# Patient Record
Sex: FEMALE | Race: WHITE | NOT HISPANIC OR LATINO | Employment: OTHER | URBAN - METROPOLITAN AREA
[De-identification: names, ages, dates, MRNs, and addresses within clinical notes are randomized per-mention and may not be internally consistent; named-entity substitution may affect disease eponyms.]

---

## 2017-05-03 ENCOUNTER — OFFICE VISIT (OUTPATIENT)
Dept: GASTROENTEROLOGY | Facility: CLINIC | Age: 72
End: 2017-05-03

## 2017-05-03 VITALS
DIASTOLIC BLOOD PRESSURE: 78 MMHG | WEIGHT: 158.4 LBS | SYSTOLIC BLOOD PRESSURE: 130 MMHG | HEIGHT: 68 IN | BODY MASS INDEX: 24.01 KG/M2

## 2017-05-03 DIAGNOSIS — K63.5 COLON POLYPS: Primary | ICD-10-CM

## 2017-05-03 DIAGNOSIS — K59.04 CHRONIC IDIOPATHIC CONSTIPATION: ICD-10-CM

## 2017-05-03 PROCEDURE — 99203 OFFICE O/P NEW LOW 30 MIN: CPT | Performed by: INTERNAL MEDICINE

## 2017-05-03 RX ORDER — VALSARTAN AND HYDROCHLOROTHIAZIDE 160; 12.5 MG/1; MG/1
1 TABLET, FILM COATED ORAL DAILY
COMMUNITY
Start: 2017-02-20 | End: 2017-08-30 | Stop reason: HOSPADM

## 2017-05-03 RX ORDER — LEVOTHYROXINE SODIUM 88 UG/1
88 TABLET ORAL DAILY
COMMUNITY
Start: 2017-02-12

## 2017-05-03 RX ORDER — RANITIDINE 150 MG/1
150 TABLET ORAL DAILY
COMMUNITY
Start: 2017-02-13 | End: 2020-09-22 | Stop reason: HOSPADM

## 2017-05-09 ENCOUNTER — TELEPHONE (OUTPATIENT)
Dept: GASTROENTEROLOGY | Facility: CLINIC | Age: 72
End: 2017-05-09

## 2017-07-10 ENCOUNTER — TRANSCRIBE ORDERS (OUTPATIENT)
Dept: ADMINISTRATIVE | Facility: HOSPITAL | Age: 72
End: 2017-07-10

## 2017-07-10 DIAGNOSIS — Z12.31 VISIT FOR SCREENING MAMMOGRAM: Primary | ICD-10-CM

## 2017-08-04 ENCOUNTER — HOSPITAL ENCOUNTER (OUTPATIENT)
Dept: MAMMOGRAPHY | Facility: HOSPITAL | Age: 72
Discharge: HOME | End: 2017-08-04
Admitting: INTERNAL MEDICINE

## 2017-08-04 DIAGNOSIS — Z12.31 VISIT FOR SCREENING MAMMOGRAM: ICD-10-CM

## 2017-08-04 PROCEDURE — 77063 BREAST TOMOSYNTHESIS BI: CPT

## 2017-08-04 PROCEDURE — G0202 SCR MAMMO BI INCL CAD: HCPCS

## 2017-08-29 ENCOUNTER — APPOINTMENT (OUTPATIENT)
Dept: CT IMAGING | Facility: HOSPITAL | Age: 72
End: 2017-08-29

## 2017-08-29 ENCOUNTER — HOSPITAL ENCOUNTER (OUTPATIENT)
Facility: HOSPITAL | Age: 72
Setting detail: OBSERVATION
Discharge: HOME | End: 2017-08-30
Attending: EMERGENCY MEDICINE | Admitting: INTERNAL MEDICINE

## 2017-08-29 DIAGNOSIS — R55 SYNCOPE AND COLLAPSE: Primary | ICD-10-CM

## 2017-08-29 DIAGNOSIS — R11.2 NON-INTRACTABLE VOMITING WITH NAUSEA, UNSPECIFIED VOMITING TYPE: ICD-10-CM

## 2017-08-29 DIAGNOSIS — D72.829 LEUKOCYTOSIS, UNSPECIFIED TYPE: ICD-10-CM

## 2017-08-29 DIAGNOSIS — IMO0002 POSITIONAL VERTIGO, UNSPECIFIED LATERALITY: ICD-10-CM

## 2017-08-29 PROCEDURE — 96375 TX/PRO/DX INJ NEW DRUG ADDON: CPT

## 2017-08-29 PROCEDURE — 96361 HYDRATE IV INFUSION ADD-ON: CPT

## 2017-08-29 PROCEDURE — 96374 THER/PROPH/DIAG INJ IV PUSH: CPT

## 2017-08-29 PROCEDURE — 25010000002 ONDANSETRON PER 1 MG: Performed by: EMERGENCY MEDICINE

## 2017-08-29 PROCEDURE — 99284 EMERGENCY DEPT VISIT MOD MDM: CPT

## 2017-08-29 PROCEDURE — 93005 ELECTROCARDIOGRAM TRACING: CPT | Performed by: EMERGENCY MEDICINE

## 2017-08-29 RX ORDER — LORAZEPAM 2 MG/ML
0.5 INJECTION INTRAMUSCULAR ONCE
Status: COMPLETED | OUTPATIENT
Start: 2017-08-29 | End: 2017-08-30

## 2017-08-29 RX ORDER — ONDANSETRON 2 MG/ML
4 INJECTION INTRAMUSCULAR; INTRAVENOUS ONCE
Status: COMPLETED | OUTPATIENT
Start: 2017-08-29 | End: 2017-08-29

## 2017-08-29 RX ORDER — SODIUM CHLORIDE 0.9 % (FLUSH) 0.9 %
10 SYRINGE (ML) INJECTION AS NEEDED
Status: DISCONTINUED | OUTPATIENT
Start: 2017-08-29 | End: 2017-08-30 | Stop reason: HOSPADM

## 2017-08-29 RX ADMIN — ONDANSETRON 4 MG: 2 INJECTION INTRAMUSCULAR; INTRAVENOUS at 23:57

## 2017-08-29 RX ADMIN — SODIUM CHLORIDE 1000 ML: 9 INJECTION, SOLUTION INTRAVENOUS at 23:59

## 2017-08-30 ENCOUNTER — APPOINTMENT (OUTPATIENT)
Dept: CT IMAGING | Facility: HOSPITAL | Age: 72
End: 2017-08-30

## 2017-08-30 VITALS
SYSTOLIC BLOOD PRESSURE: 115 MMHG | HEART RATE: 67 BPM | BODY MASS INDEX: 23.82 KG/M2 | TEMPERATURE: 98 F | HEIGHT: 68 IN | WEIGHT: 157.2 LBS | RESPIRATION RATE: 18 BRPM | OXYGEN SATURATION: 96 % | DIASTOLIC BLOOD PRESSURE: 58 MMHG

## 2017-08-30 PROBLEM — R55 SYNCOPE AND COLLAPSE: Status: ACTIVE | Noted: 2017-08-30

## 2017-08-30 PROBLEM — R11.2 N&V (NAUSEA AND VOMITING): Status: ACTIVE | Noted: 2017-08-30

## 2017-08-30 PROBLEM — H81.13 BENIGN PAROXYSMAL POSITIONAL VERTIGO DUE TO BILATERAL VESTIBULAR DISORDER: Status: ACTIVE | Noted: 2017-08-30

## 2017-08-30 PROBLEM — I10 ESSENTIAL HYPERTENSION: Status: ACTIVE | Noted: 2017-08-30

## 2017-08-30 LAB
ALBUMIN SERPL-MCNC: 3.9 G/DL (ref 3.5–5.2)
ALBUMIN/GLOB SERPL: 1.6 G/DL
ALP SERPL-CCNC: 69 U/L (ref 39–117)
ALT SERPL W P-5'-P-CCNC: 25 U/L (ref 1–33)
ANION GAP SERPL CALCULATED.3IONS-SCNC: 14.3 MMOL/L
ANION GAP SERPL CALCULATED.3IONS-SCNC: 15.3 MMOL/L
AST SERPL-CCNC: 19 U/L (ref 1–32)
BASOPHILS # BLD AUTO: 0.01 10*3/MM3 (ref 0–0.2)
BASOPHILS NFR BLD AUTO: 0.1 % (ref 0–1.5)
BILIRUB SERPL-MCNC: 0.2 MG/DL (ref 0.1–1.2)
BUN BLD-MCNC: 24 MG/DL (ref 8–23)
BUN BLD-MCNC: 27 MG/DL (ref 8–23)
BUN/CREAT SERPL: 32 (ref 7–25)
BUN/CREAT SERPL: 32.9 (ref 7–25)
CALCIUM SPEC-SCNC: 8.1 MG/DL (ref 8.6–10.5)
CALCIUM SPEC-SCNC: 8.7 MG/DL (ref 8.6–10.5)
CHLORIDE SERPL-SCNC: 103 MMOL/L (ref 98–107)
CHLORIDE SERPL-SCNC: 107 MMOL/L (ref 98–107)
CO2 SERPL-SCNC: 23.7 MMOL/L (ref 22–29)
CO2 SERPL-SCNC: 24.7 MMOL/L (ref 22–29)
CREAT BLD-MCNC: 0.75 MG/DL (ref 0.57–1)
CREAT BLD-MCNC: 0.82 MG/DL (ref 0.57–1)
DEPRECATED RDW RBC AUTO: 49.1 FL (ref 37–54)
DEPRECATED RDW RBC AUTO: 49.4 FL (ref 37–54)
EOSINOPHIL # BLD AUTO: 0.05 10*3/MM3 (ref 0–0.7)
EOSINOPHIL NFR BLD AUTO: 0.3 % (ref 0.3–6.2)
ERYTHROCYTE [DISTWIDTH] IN BLOOD BY AUTOMATED COUNT: 14.5 % (ref 11.7–13)
ERYTHROCYTE [DISTWIDTH] IN BLOOD BY AUTOMATED COUNT: 14.5 % (ref 11.7–13)
GFR SERPL CREATININE-BSD FRML MDRD: 68 ML/MIN/1.73
GFR SERPL CREATININE-BSD FRML MDRD: 76 ML/MIN/1.73
GLOBULIN UR ELPH-MCNC: 2.4 GM/DL
GLUCOSE BLD-MCNC: 117 MG/DL (ref 65–99)
GLUCOSE BLD-MCNC: 133 MG/DL (ref 65–99)
HCT VFR BLD AUTO: 37.4 % (ref 35.6–45.5)
HCT VFR BLD AUTO: 39.4 % (ref 35.6–45.5)
HGB BLD-MCNC: 11.9 G/DL (ref 11.9–15.5)
HGB BLD-MCNC: 12.5 G/DL (ref 11.9–15.5)
IMM GRANULOCYTES # BLD: 0.05 10*3/MM3 (ref 0–0.03)
IMM GRANULOCYTES NFR BLD: 0.3 % (ref 0–0.5)
LYMPHOCYTES # BLD AUTO: 0.97 10*3/MM3 (ref 0.9–4.8)
LYMPHOCYTES NFR BLD AUTO: 6.7 % (ref 19.6–45.3)
MCH RBC QN AUTO: 29.2 PG (ref 26.9–32)
MCH RBC QN AUTO: 29.3 PG (ref 26.9–32)
MCHC RBC AUTO-ENTMCNC: 31.7 G/DL (ref 32.4–36.3)
MCHC RBC AUTO-ENTMCNC: 31.8 G/DL (ref 32.4–36.3)
MCV RBC AUTO: 91.9 FL (ref 80.5–98.2)
MCV RBC AUTO: 92.5 FL (ref 80.5–98.2)
MONOCYTES # BLD AUTO: 0.57 10*3/MM3 (ref 0.2–1.2)
MONOCYTES NFR BLD AUTO: 4 % (ref 5–12)
NEUTROPHILS # BLD AUTO: 12.76 10*3/MM3 (ref 1.9–8.1)
NEUTROPHILS NFR BLD AUTO: 88.6 % (ref 42.7–76)
PLATELET # BLD AUTO: 194 10*3/MM3 (ref 140–500)
PLATELET # BLD AUTO: 213 10*3/MM3 (ref 140–500)
PMV BLD AUTO: 10.1 FL (ref 6–12)
PMV BLD AUTO: 10.3 FL (ref 6–12)
POTASSIUM BLD-SCNC: 3.6 MMOL/L (ref 3.5–5.2)
POTASSIUM BLD-SCNC: 4.1 MMOL/L (ref 3.5–5.2)
PROT SERPL-MCNC: 6.3 G/DL (ref 6–8.5)
RBC # BLD AUTO: 4.07 10*6/MM3 (ref 3.9–5.2)
RBC # BLD AUTO: 4.26 10*6/MM3 (ref 3.9–5.2)
SODIUM BLD-SCNC: 141 MMOL/L (ref 136–145)
SODIUM BLD-SCNC: 147 MMOL/L (ref 136–145)
TROPONIN T SERPL-MCNC: <0.01 NG/ML (ref 0–0.03)
WBC NRBC COR # BLD: 14.41 10*3/MM3 (ref 4.5–10.7)
WBC NRBC COR # BLD: 9.26 10*3/MM3 (ref 4.5–10.7)

## 2017-08-30 PROCEDURE — G0378 HOSPITAL OBSERVATION PER HR: HCPCS

## 2017-08-30 PROCEDURE — 97110 THERAPEUTIC EXERCISES: CPT | Performed by: PHYSICAL THERAPIST

## 2017-08-30 PROCEDURE — G8978 MOBILITY CURRENT STATUS: HCPCS | Performed by: PHYSICAL THERAPIST

## 2017-08-30 PROCEDURE — 80053 COMPREHEN METABOLIC PANEL: CPT | Performed by: EMERGENCY MEDICINE

## 2017-08-30 PROCEDURE — G8979 MOBILITY GOAL STATUS: HCPCS | Performed by: PHYSICAL THERAPIST

## 2017-08-30 PROCEDURE — G8980 MOBILITY D/C STATUS: HCPCS | Performed by: PHYSICAL THERAPIST

## 2017-08-30 PROCEDURE — 84484 ASSAY OF TROPONIN QUANT: CPT | Performed by: EMERGENCY MEDICINE

## 2017-08-30 PROCEDURE — 70450 CT HEAD/BRAIN W/O DYE: CPT

## 2017-08-30 PROCEDURE — 96375 TX/PRO/DX INJ NEW DRUG ADDON: CPT

## 2017-08-30 PROCEDURE — 96361 HYDRATE IV INFUSION ADD-ON: CPT

## 2017-08-30 PROCEDURE — 85025 COMPLETE CBC W/AUTO DIFF WBC: CPT | Performed by: EMERGENCY MEDICINE

## 2017-08-30 PROCEDURE — 85027 COMPLETE CBC AUTOMATED: CPT | Performed by: INTERNAL MEDICINE

## 2017-08-30 PROCEDURE — 93010 ELECTROCARDIOGRAM REPORT: CPT | Performed by: INTERNAL MEDICINE

## 2017-08-30 PROCEDURE — 25010000002 LORAZEPAM PER 2 MG: Performed by: EMERGENCY MEDICINE

## 2017-08-30 PROCEDURE — 97161 PT EVAL LOW COMPLEX 20 MIN: CPT | Performed by: PHYSICAL THERAPIST

## 2017-08-30 PROCEDURE — 94799 UNLISTED PULMONARY SVC/PX: CPT

## 2017-08-30 RX ORDER — MECLIZINE HYDROCHLORIDE 25 MG/1
25 TABLET ORAL 3 TIMES DAILY PRN
Status: DISCONTINUED | OUTPATIENT
Start: 2017-08-30 | End: 2017-08-30 | Stop reason: HOSPADM

## 2017-08-30 RX ORDER — SODIUM CHLORIDE 9 MG/ML
75 INJECTION, SOLUTION INTRAVENOUS CONTINUOUS
Status: DISCONTINUED | OUTPATIENT
Start: 2017-08-30 | End: 2017-08-30 | Stop reason: HOSPADM

## 2017-08-30 RX ORDER — SODIUM CHLORIDE 0.9 % (FLUSH) 0.9 %
1-10 SYRINGE (ML) INJECTION AS NEEDED
Status: DISCONTINUED | OUTPATIENT
Start: 2017-08-30 | End: 2017-08-30 | Stop reason: HOSPADM

## 2017-08-30 RX ORDER — MECLIZINE HYDROCHLORIDE 25 MG/1
25 TABLET ORAL 3 TIMES DAILY PRN
Qty: 20 TABLET | Refills: 0 | Status: ON HOLD | OUTPATIENT
Start: 2017-08-30 | End: 2017-10-18 | Stop reason: ALTCHOICE

## 2017-08-30 RX ORDER — ONDANSETRON 4 MG/1
4 TABLET, FILM COATED ORAL EVERY 6 HOURS PRN
Status: DISCONTINUED | OUTPATIENT
Start: 2017-08-30 | End: 2017-08-30 | Stop reason: HOSPADM

## 2017-08-30 RX ORDER — ONDANSETRON 2 MG/ML
4 INJECTION INTRAMUSCULAR; INTRAVENOUS EVERY 6 HOURS PRN
Status: DISCONTINUED | OUTPATIENT
Start: 2017-08-30 | End: 2017-08-30 | Stop reason: HOSPADM

## 2017-08-30 RX ORDER — NITROGLYCERIN 0.4 MG/1
0.4 TABLET SUBLINGUAL
Status: DISCONTINUED | OUTPATIENT
Start: 2017-08-30 | End: 2017-08-30 | Stop reason: HOSPADM

## 2017-08-30 RX ORDER — VALSARTAN 80 MG/1
80 TABLET ORAL DAILY
Qty: 30 TABLET | Refills: 0 | Status: ON HOLD | OUTPATIENT
Start: 2017-08-30 | End: 2017-10-18 | Stop reason: ALTCHOICE

## 2017-08-30 RX ORDER — CALCIUM CARBONATE 200(500)MG
2 TABLET,CHEWABLE ORAL 2 TIMES DAILY PRN
Status: DISCONTINUED | OUTPATIENT
Start: 2017-08-30 | End: 2017-08-30 | Stop reason: HOSPADM

## 2017-08-30 RX ORDER — ONDANSETRON 4 MG/1
4 TABLET, ORALLY DISINTEGRATING ORAL EVERY 6 HOURS PRN
Status: DISCONTINUED | OUTPATIENT
Start: 2017-08-30 | End: 2017-08-30 | Stop reason: HOSPADM

## 2017-08-30 RX ORDER — ACETAMINOPHEN 325 MG/1
650 TABLET ORAL EVERY 4 HOURS PRN
Status: DISCONTINUED | OUTPATIENT
Start: 2017-08-30 | End: 2017-08-30 | Stop reason: HOSPADM

## 2017-08-30 RX ADMIN — SODIUM CHLORIDE 75 ML/HR: 9 INJECTION, SOLUTION INTRAVENOUS at 04:45

## 2017-08-30 RX ADMIN — LORAZEPAM 0.5 MG: 2 INJECTION INTRAMUSCULAR; INTRAVENOUS at 00:16

## 2017-10-18 ENCOUNTER — ANESTHESIA EVENT (OUTPATIENT)
Dept: GASTROENTEROLOGY | Facility: HOSPITAL | Age: 72
End: 2017-10-18

## 2017-10-18 ENCOUNTER — HOSPITAL ENCOUNTER (OUTPATIENT)
Facility: HOSPITAL | Age: 72
Setting detail: HOSPITAL OUTPATIENT SURGERY
Discharge: HOME | End: 2017-10-18
Attending: INTERNAL MEDICINE | Admitting: INTERNAL MEDICINE

## 2017-10-18 ENCOUNTER — ANESTHESIA (OUTPATIENT)
Dept: GASTROENTEROLOGY | Facility: HOSPITAL | Age: 72
End: 2017-10-18

## 2017-10-18 VITALS
BODY MASS INDEX: 23.49 KG/M2 | WEIGHT: 155 LBS | DIASTOLIC BLOOD PRESSURE: 88 MMHG | TEMPERATURE: 97.5 F | SYSTOLIC BLOOD PRESSURE: 145 MMHG | RESPIRATION RATE: 16 BRPM | OXYGEN SATURATION: 100 % | HEART RATE: 54 BPM | HEIGHT: 68 IN

## 2017-10-18 DIAGNOSIS — K63.5 COLON POLYPS: ICD-10-CM

## 2017-10-18 PROCEDURE — 45380 COLONOSCOPY AND BIOPSY: CPT | Performed by: INTERNAL MEDICINE

## 2017-10-18 PROCEDURE — 45385 COLONOSCOPY W/LESION REMOVAL: CPT | Performed by: INTERNAL MEDICINE

## 2017-10-18 PROCEDURE — 25010000002 ONDANSETRON PER 1 MG: Performed by: ANESTHESIOLOGY

## 2017-10-18 PROCEDURE — 88305 TISSUE EXAM BY PATHOLOGIST: CPT | Performed by: INTERNAL MEDICINE

## 2017-10-18 PROCEDURE — 25010000002 PROPOFOL 10 MG/ML EMULSION: Performed by: ANESTHESIOLOGY

## 2017-10-18 PROCEDURE — S0260 H&P FOR SURGERY: HCPCS | Performed by: INTERNAL MEDICINE

## 2017-10-18 RX ORDER — PROPOFOL 10 MG/ML
VIAL (ML) INTRAVENOUS AS NEEDED
Status: DISCONTINUED | OUTPATIENT
Start: 2017-10-18 | End: 2017-10-18 | Stop reason: SURG

## 2017-10-18 RX ORDER — ONDANSETRON 2 MG/ML
INJECTION INTRAMUSCULAR; INTRAVENOUS AS NEEDED
Status: DISCONTINUED | OUTPATIENT
Start: 2017-10-18 | End: 2017-10-18 | Stop reason: SURG

## 2017-10-18 RX ORDER — SODIUM CHLORIDE 0.9 % (FLUSH) 0.9 %
3 SYRINGE (ML) INJECTION AS NEEDED
Status: DISCONTINUED | OUTPATIENT
Start: 2017-10-18 | End: 2017-10-18 | Stop reason: HOSPADM

## 2017-10-18 RX ORDER — PROPOFOL 10 MG/ML
VIAL (ML) INTRAVENOUS CONTINUOUS PRN
Status: DISCONTINUED | OUTPATIENT
Start: 2017-10-18 | End: 2017-10-18 | Stop reason: SURG

## 2017-10-18 RX ORDER — SODIUM CHLORIDE, SODIUM LACTATE, POTASSIUM CHLORIDE, CALCIUM CHLORIDE 600; 310; 30; 20 MG/100ML; MG/100ML; MG/100ML; MG/100ML
1000 INJECTION, SOLUTION INTRAVENOUS CONTINUOUS PRN
Status: DISCONTINUED | OUTPATIENT
Start: 2017-10-18 | End: 2017-10-18 | Stop reason: HOSPADM

## 2017-10-18 RX ORDER — LIDOCAINE HYDROCHLORIDE 20 MG/ML
INJECTION, SOLUTION INFILTRATION; PERINEURAL AS NEEDED
Status: DISCONTINUED | OUTPATIENT
Start: 2017-10-18 | End: 2017-10-18 | Stop reason: SURG

## 2017-10-18 RX ORDER — ASPIRIN 81 MG/1
81 TABLET ORAL 3 TIMES WEEKLY
COMMUNITY
End: 2020-09-02

## 2017-10-18 RX ADMIN — PROPOFOL 140 MG: 10 INJECTION, EMULSION INTRAVENOUS at 08:09

## 2017-10-18 RX ADMIN — PROPOFOL 180 MCG/KG/MIN: 10 INJECTION, EMULSION INTRAVENOUS at 08:12

## 2017-10-18 RX ADMIN — ONDANSETRON 4 MG: 2 INJECTION INTRAMUSCULAR; INTRAVENOUS at 08:34

## 2017-10-18 RX ADMIN — SODIUM CHLORIDE, POTASSIUM CHLORIDE, SODIUM LACTATE AND CALCIUM CHLORIDE 1000 ML: 600; 310; 30; 20 INJECTION, SOLUTION INTRAVENOUS at 07:50

## 2017-10-18 RX ADMIN — LIDOCAINE HYDROCHLORIDE 50 MG: 20 INJECTION, SOLUTION INFILTRATION; PERINEURAL at 08:09

## 2017-10-18 NOTE — PLAN OF CARE
Problem: GI Endoscopy (Adult)  Intervention: Monitor/Manage Procedure Recovery    10/18/17 0716   Respiratory Interventions   Airway/Ventilation Management airway patency maintained   Coping/Psychosocial Interventions   Environmental Support calm environment promoted   Activity   Activity Type activity adjusted per tolerance   Cardiac Interventions   Warming Thermoregulation Maintenance warm blankets applied       Intervention: Prevent Leann-procedural Injury    10/18/17 0716   Positioning   Positioning side lying, left         Goal: Signs and Symptoms of Listed Potential Problems Will be Absent or Manageable (GI Endoscopy)  Outcome: Ongoing (interventions implemented as appropriate)    10/18/17 0716   GI Endoscopy   Problems Assessed (GI Endoscopy) all   Problems Present (GI Endoscopy) none         Problem: Patient Care Overview (Adult)  Goal: Plan of Care Review  Outcome: Ongoing (interventions implemented as appropriate)    10/18/17 0716   Coping/Psychosocial Response Interventions   Plan Of Care Reviewed With patient   Patient Care Overview   Progress progress toward functional goals as expected       Goal: Adult Individualization and Mutuality  Outcome: Ongoing (interventions implemented as appropriate)  Goal: Discharge Needs Assessment  Outcome: Ongoing (interventions implemented as appropriate)    10/18/17 0716   Discharge Needs Assessment   Concerns To Be Addressed no discharge needs identified   Discharge Disposition home or self-care   Living Environment   Transportation Available car;family or friend will provide

## 2017-10-18 NOTE — ANESTHESIA PREPROCEDURE EVALUATION
Anesthesia Evaluation     Patient summary reviewed and Nursing notes reviewed   history of anesthetic complications: PONV  NPO Solid Status: > 8 hours  NPO Liquid Status: > 6 hours     Airway   Mallampati: II  TM distance: >3 FB  Neck ROM: full  no difficulty expected  Dental - normal exam     Pulmonary - negative pulmonary ROS and normal exam   Cardiovascular - normal exam    Rhythm: regular  Rate: normal    (+) hypertension,       Neuro/Psych  (+) syncope,    GI/Hepatic/Renal/Endo    (+)  hypothyroidism,     Musculoskeletal (-) negative ROS    Abdominal  - normal exam   Substance History - negative use     OB/GYN negative ob/gyn ROS         Other - negative ROS                                       Anesthesia Plan    ASA 2     MAC     intravenous induction   Anesthetic plan and risks discussed with patient.

## 2017-10-18 NOTE — ANESTHESIA POSTPROCEDURE EVALUATION
"Patient: Anabell Damian    Procedure Summary     Date Anesthesia Start Anesthesia Stop Room / Location    10/18/17 0803 0909  [facility]       Procedure Diagnosis Surgeon Provider    COLONOSCOPY into cecum with cold biopsy polypectomy and hot snare polypectomies (N/A ) Colon polyps  (Colon polyps [K63.5]) MD Roya Bradley MD          Anesthesia Type: MAC  Last vitals  BP   127/78 (10/18/17 0915)   Temp   36.4 °C (97.5 °F) (10/18/17 0915)   Pulse   60 (10/18/17 0915)   Resp   16 (10/18/17 0915)     SpO2   98 % (10/18/17 0915)     Post Anesthesia Care and Evaluation    Patient location during evaluation: PACU  Patient participation: complete - patient participated  Level of consciousness: awake  Pain score: 0  Pain management: adequate  Airway patency: patent  Anesthetic complications: No anesthetic complications  PONV Status: none  Cardiovascular status: acceptable  Respiratory status: acceptable  Hydration status: acceptable    Comments: /78 (BP Location: Left arm, Patient Position: Lying)  Pulse 60  Temp 36.4 °C (97.5 °F) (Oral)   Resp 16  Ht 68\" (172.7 cm)  Wt 155 lb (70.3 kg)  SpO2 98%  BMI 23.57 kg/m2      "

## 2017-10-20 LAB
CYTO UR: NORMAL
LAB AP CASE REPORT: NORMAL
Lab: NORMAL
PATH REPORT.FINAL DX SPEC: NORMAL
PATH REPORT.GROSS SPEC: NORMAL

## 2017-10-30 ENCOUNTER — TELEPHONE (OUTPATIENT)
Dept: GASTROENTEROLOGY | Facility: CLINIC | Age: 72
End: 2017-10-30

## 2018-08-13 ENCOUNTER — TRANSCRIBE ORDERS (OUTPATIENT)
Dept: ADMINISTRATIVE | Facility: HOSPITAL | Age: 73
End: 2018-08-13

## 2018-08-13 DIAGNOSIS — Z12.39 SCREENING BREAST EXAMINATION: Primary | ICD-10-CM

## 2018-08-24 ENCOUNTER — HOSPITAL ENCOUNTER (OUTPATIENT)
Dept: MAMMOGRAPHY | Facility: HOSPITAL | Age: 73
Discharge: HOME | End: 2018-08-24
Admitting: INTERNAL MEDICINE

## 2018-08-24 DIAGNOSIS — Z12.39 SCREENING BREAST EXAMINATION: ICD-10-CM

## 2018-08-24 PROCEDURE — 77063 BREAST TOMOSYNTHESIS BI: CPT

## 2018-08-24 PROCEDURE — 77067 SCR MAMMO BI INCL CAD: CPT

## 2019-07-29 ENCOUNTER — TRANSCRIBE ORDERS (OUTPATIENT)
Dept: ADMINISTRATIVE | Facility: HOSPITAL | Age: 74
End: 2019-07-29

## 2019-07-29 DIAGNOSIS — Z12.39 BREAST CANCER SCREENING: Primary | ICD-10-CM

## 2019-08-26 ENCOUNTER — HOSPITAL ENCOUNTER (OUTPATIENT)
Dept: MAMMOGRAPHY | Facility: HOSPITAL | Age: 74
Discharge: HOME | End: 2019-08-26
Admitting: INTERNAL MEDICINE

## 2019-08-26 DIAGNOSIS — Z12.39 BREAST CANCER SCREENING: ICD-10-CM

## 2019-08-26 PROCEDURE — 77063 BREAST TOMOSYNTHESIS BI: CPT

## 2019-08-26 PROCEDURE — 77067 SCR MAMMO BI INCL CAD: CPT

## 2019-12-13 ENCOUNTER — TRANSCRIBE ORDERS (OUTPATIENT)
Dept: ADMINISTRATIVE | Facility: HOSPITAL | Age: 74
End: 2019-12-13

## 2019-12-13 DIAGNOSIS — Z13.6 ENCOUNTER FOR SCREENING FOR VASCULAR DISEASE: Primary | ICD-10-CM

## 2019-12-20 ENCOUNTER — HOSPITAL ENCOUNTER (OUTPATIENT)
Dept: CARDIOLOGY | Facility: HOSPITAL | Age: 74
Discharge: HOME | End: 2019-12-20
Admitting: INTERNAL MEDICINE

## 2019-12-20 VITALS
BODY MASS INDEX: 23.95 KG/M2 | WEIGHT: 158 LBS | DIASTOLIC BLOOD PRESSURE: 80 MMHG | HEIGHT: 68 IN | SYSTOLIC BLOOD PRESSURE: 162 MMHG | HEART RATE: 66 BPM

## 2019-12-20 DIAGNOSIS — Z13.6 ENCOUNTER FOR SCREENING FOR VASCULAR DISEASE: ICD-10-CM

## 2019-12-20 LAB
BH CV VAS BP LEFT ARM: NORMAL MMHG
BH CV VAS BP RIGHT ARM: NORMAL MMHG
BH CV XLRA MEAS LEFT ICA/CCA RATIO: 1.54
BH CV XLRA MEAS LEFT MID CCA PSV: NORMAL CM/SEC
BH CV XLRA MEAS LEFT MID ICA PSV: NORMAL CM/SEC
BH CV XLRA MEAS LEFT PROX ECA PSV: NORMAL CM/SEC
BH CV XLRA MEAS RIGHT ICA/CCA RATIO: 1.25
BH CV XLRA MEAS RIGHT MID CCA PSV: NORMAL CM/SEC
BH CV XLRA MEAS RIGHT MID ICA PSV: NORMAL CM/SEC
BH CV XLRA MEAS RIGHT PROX ECA PSV: NORMAL CM/SEC

## 2019-12-20 PROCEDURE — 93799 UNLISTED CV SVC/PROCEDURE: CPT

## 2020-08-07 ENCOUNTER — TRANSCRIBE ORDERS (OUTPATIENT)
Dept: ADMINISTRATIVE | Facility: HOSPITAL | Age: 75
End: 2020-08-07

## 2020-08-07 DIAGNOSIS — Z12.31 SCREENING MAMMOGRAM, ENCOUNTER FOR: Primary | ICD-10-CM

## 2020-09-02 ENCOUNTER — HOSPITAL ENCOUNTER (OUTPATIENT)
Dept: MAMMOGRAPHY | Facility: HOSPITAL | Age: 75
Discharge: HOME | End: 2020-09-02
Admitting: INTERNAL MEDICINE

## 2020-09-02 ENCOUNTER — OFFICE VISIT (OUTPATIENT)
Dept: GASTROENTEROLOGY | Facility: CLINIC | Age: 75
End: 2020-09-02

## 2020-09-02 VITALS — WEIGHT: 154.4 LBS | TEMPERATURE: 98 F | BODY MASS INDEX: 23.4 KG/M2 | HEIGHT: 68 IN

## 2020-09-02 DIAGNOSIS — Z12.11 ENCOUNTER FOR SCREENING FOR MALIGNANT NEOPLASM OF COLON: ICD-10-CM

## 2020-09-02 DIAGNOSIS — Z80.0 FH: COLON CANCER: ICD-10-CM

## 2020-09-02 DIAGNOSIS — K21.9 GASTROESOPHAGEAL REFLUX DISEASE, ESOPHAGITIS PRESENCE NOT SPECIFIED: ICD-10-CM

## 2020-09-02 DIAGNOSIS — D12.6 ADENOMATOUS POLYP OF COLON, UNSPECIFIED PART OF COLON: Primary | ICD-10-CM

## 2020-09-02 DIAGNOSIS — Z12.31 SCREENING MAMMOGRAM, ENCOUNTER FOR: ICD-10-CM

## 2020-09-02 PROCEDURE — 77063 BREAST TOMOSYNTHESIS BI: CPT

## 2020-09-02 PROCEDURE — 77067 SCR MAMMO BI INCL CAD: CPT

## 2020-09-02 PROCEDURE — 99214 OFFICE O/P EST MOD 30 MIN: CPT | Performed by: NURSE PRACTITIONER

## 2020-09-02 RX ORDER — GLUCOSAMINE HCL 500 MG
1 TABLET ORAL DAILY
COMMUNITY

## 2020-09-02 RX ORDER — FAMOTIDINE 40 MG/1
40 TABLET, FILM COATED ORAL
COMMUNITY
Start: 2020-08-19

## 2020-09-02 RX ORDER — FEXOFENADINE HCL 180 MG/1
180 TABLET ORAL DAILY
COMMUNITY
End: 2022-11-17 | Stop reason: HOSPADM

## 2020-09-02 RX ORDER — MONTELUKAST SODIUM 10 MG/1
10 TABLET ORAL DAILY
Status: ON HOLD | COMMUNITY
Start: 2020-07-26 | End: 2022-11-15

## 2020-09-02 RX ORDER — FLUTICASONE PROPIONATE 50 MCG
2 SPRAY, SUSPENSION (ML) NASAL DAILY
Status: ON HOLD | COMMUNITY
Start: 2015-12-14 | End: 2022-11-15

## 2020-09-02 RX ORDER — ASCORBIC ACID 500 MG
4000 TABLET ORAL DAILY
COMMUNITY

## 2020-09-02 RX ORDER — SPIRONOLACTONE 25 MG/1
25 TABLET ORAL DAILY
COMMUNITY
Start: 2020-08-11

## 2020-09-02 RX ORDER — CHLORAL HYDRATE 500 MG
CAPSULE ORAL DAILY
COMMUNITY

## 2020-09-02 RX ORDER — MULTIVITAMIN WITH IRON
100 TABLET ORAL DAILY
COMMUNITY

## 2020-09-02 NOTE — PROGRESS NOTES
Chief Complaint   Patient presents with   • wishs to discuss possible c-scope       Anabell Damian is a  75 y.o. female here for a follow up visit for screening colonoscopy.    HPI  75-year-old female presents today for follow-up visit for screening colonoscopy.  She is a patient of Dr. Borden.  She was last seen in the office on 5/3/2017.  She has a history of adenomatous colon polyps and a family history with a paternal aunt with colon cancer.  She is due for a another screening colonoscopy.  She is happy to report that her bowels are moving well.  Occasionally she will have constipation and admits that she eats prunes and this seems to resolve itself on its own.  She denies any GI issues today.  She does have a history of GERD and admits she is taking Pepcid 40 mg at bedtime and this works great for her.  She denies any breakthrough reflux at this time.  Last colonoscopy was done on 10/2017.  She denies any dysphagia, reflux, abdominal pain, nausea vomiting, diarrhea, rectal bleeding or melena.  She notes her appetite is good and her weight is stable.  Past Medical History:   Diagnosis Date   • Family hx of colon cancer    • Hypertension    • Hypothyroidism    • PONV (postoperative nausea and vomiting)        Past Surgical History:   Procedure Laterality Date   • BREAST CYST ASPIRATION     • COLONOSCOPY  06/21/2006   • COLONOSCOPY N/A 10/18/2017    Procedure: COLONOSCOPY into cecum with cold biopsy polypectomy and hot snare polypectomies;  Surgeon: Rosanne Borden MD;  Location: [facility] ENDOSCOPY;  Service:    • HEMORRHOIDECTOMY     • OTHER SURGICAL HISTORY      rectocele repair   • TUBAL ABDOMINAL LIGATION  1982       Scheduled Meds:    Continuous Infusions:  No current facility-administered medications for this visit.     PRN Meds:.    Allergies   Allergen Reactions   • Cefdinir Hives   • Hydrocodone    • Levofloxacin      Tendon pain in left shoulder    • Lisinopril      cough   • Penicillins        Social  History     Socioeconomic History   • Marital status:      Spouse name: Not on file   • Number of children: Not on file   • Years of education: Not on file   • Highest education level: Not on file   Tobacco Use   • Smoking status: Former Smoker     Packs/day: 1.00     Last attempt to quit:      Years since quittin.6   • Smokeless tobacco: Never Used   Substance and Sexual Activity   • Alcohol use: Yes     Comment: 4-5 drinks per week   • Drug use: No   • Sexual activity: Defer       Family History   Problem Relation Age of Onset   • Colon cancer Paternal Aunt        Review of Systems   Constitutional: Negative for appetite change, chills, diaphoresis, fatigue, fever and unexpected weight change.   HENT: Negative for nosebleeds, postnasal drip, sore throat, trouble swallowing and voice change.    Respiratory: Negative for cough, choking, chest tightness, shortness of breath and wheezing.    Cardiovascular: Negative for chest pain, palpitations and leg swelling.   Gastrointestinal: Negative for abdominal distention, abdominal pain, anal bleeding, blood in stool, constipation, diarrhea, nausea, rectal pain and vomiting.   Endocrine: Negative for polydipsia, polyphagia and polyuria.   Musculoskeletal: Negative for gait problem.   Skin: Negative for rash and wound.   Allergic/Immunologic: Negative for food allergies.   Neurological: Negative for dizziness, speech difficulty and light-headedness.   Psychiatric/Behavioral: Negative for confusion, self-injury, sleep disturbance and suicidal ideas.       Vitals:    20 1324   Temp: 98 °F (36.7 °C)       Physical Exam   Constitutional: She is oriented to person, place, and time. She appears well-developed and well-nourished. She does not appear ill. No distress.   HENT:   Head: Normocephalic.   Eyes: Pupils are equal, round, and reactive to light.   Cardiovascular: Normal rate, regular rhythm and normal heart sounds.   Pulmonary/Chest: Effort normal and  breath sounds normal.   Abdominal: Soft. Bowel sounds are normal. She exhibits no distension and no mass. There is no hepatosplenomegaly. There is no tenderness. There is no rebound and no guarding. No hernia.   Musculoskeletal: Normal range of motion.   Neurological: She is alert and oriented to person, place, and time.   Skin: Skin is warm and dry.   Psychiatric: She has a normal mood and affect. Her speech is normal and behavior is normal. Judgment normal.       No radiology results for the last 7 days     Assessment and plan     1. Adenomatous polyp of colon, unspecified part of colon  - Case Request; Standing  - Case Request    2. FH: colon cancer  - Case Request; Standing  - Case Request    3. Encounter for screening for malignant neoplasm of colon  - Case Request; Standing  - Case Request    4. Gastroesophageal reflux disease, esophagitis presence not specified    GERD seems well controlled on Pepcid 40 mg at bedtime.  Continue GERD precautions.  Given her history of adenomatous colon polyps and her family history of colon cancer she is due for a screening colonoscopy.  Will have the patient schedule that today while she is here.  Patient is agreeable to the scope.  Patient to call the office with any issues.  Patient to follow-up with me as needed.

## 2020-09-08 ENCOUNTER — TELEPHONE (OUTPATIENT)
Dept: GASTROENTEROLOGY | Facility: CLINIC | Age: 75
End: 2020-09-08

## 2020-09-08 NOTE — TELEPHONE ENCOUNTER
The miralax prep works very well. I would be fine with you doing that one. But yes I recommend you do both doses of prep the night before. I did mine at 6:30pm and 9:30pm only because that's what time I got home. But yes you can do both the evening before and not have to wake up super early to do the 2nd dose. As long as your bowels are clear the night before when you go to bed. You should be fine. Good luck.

## 2020-09-08 NOTE — TELEPHONE ENCOUNTER
----- Message from Anabell Damian sent at 9/5/2020  4:01 PM EDT -----  Regarding: Prescription Question  Contact: [phone]  Hi Amie, I saw you Wednesday 9/2 to get scheduled for a colonoscopy with Dr Borden. We discussed preps I requested Clinpeq. No samples available,I am told by my Pharmacy it is $173.  I do not want to pay that much.  Questions:how effective is the Miralax prep,  how does SuPrep compare to the Miralax prep? Suprep is not as expensive still over $100. Please let me know your thoughts. You mentioned taking all of prep the evening before which I prefer but the discharge/scheduling person instructed me to take a dose in the evening and the other in the  early am. Prefer to take it your way. I am scheduled 9/22 to arrive at 9:30 with procedure at 10:30. They scheduled me in a cancellation slot so it will be here very soon. Thanks Anabell Damian

## 2020-09-08 NOTE — TELEPHONE ENCOUNTER
Called pt and pt state she is in the salon getting her hair done and is asking for us to call her back.

## 2020-09-09 NOTE — TELEPHONE ENCOUNTER
Called pt and advised pt that we do have a sample of clenpiq and we will have it at the  for her to .  Pt verb understanding and reports that she already has the instructions on how to take it.

## 2020-09-15 ENCOUNTER — TRANSCRIBE ORDERS (OUTPATIENT)
Dept: SLEEP MEDICINE | Facility: HOSPITAL | Age: 75
End: 2020-09-15

## 2020-09-15 DIAGNOSIS — Z01.818 OTHER SPECIFIED PRE-OPERATIVE EXAMINATION: Primary | ICD-10-CM

## 2020-09-19 ENCOUNTER — LAB (OUTPATIENT)
Dept: LAB | Facility: HOSPITAL | Age: 75
End: 2020-09-19

## 2020-09-19 DIAGNOSIS — Z01.818 OTHER SPECIFIED PRE-OPERATIVE EXAMINATION: ICD-10-CM

## 2020-09-19 PROCEDURE — C9803 HOPD COVID-19 SPEC COLLECT: HCPCS

## 2020-09-19 PROCEDURE — U0004 COV-19 TEST NON-CDC HGH THRU: HCPCS

## 2020-09-21 LAB — SARS-COV-2 RNA RESP QL NAA+PROBE: NOT DETECTED

## 2020-09-22 ENCOUNTER — HOSPITAL ENCOUNTER (OUTPATIENT)
Facility: HOSPITAL | Age: 75
Setting detail: HOSPITAL OUTPATIENT SURGERY
Discharge: HOME | End: 2020-09-22
Attending: INTERNAL MEDICINE | Admitting: INTERNAL MEDICINE

## 2020-09-22 ENCOUNTER — ANESTHESIA (OUTPATIENT)
Dept: GASTROENTEROLOGY | Facility: HOSPITAL | Age: 75
End: 2020-09-22

## 2020-09-22 ENCOUNTER — ANESTHESIA EVENT (OUTPATIENT)
Dept: GASTROENTEROLOGY | Facility: HOSPITAL | Age: 75
End: 2020-09-22

## 2020-09-22 VITALS
HEART RATE: 63 BPM | DIASTOLIC BLOOD PRESSURE: 87 MMHG | RESPIRATION RATE: 16 BRPM | SYSTOLIC BLOOD PRESSURE: 156 MMHG | OXYGEN SATURATION: 97 % | HEIGHT: 68 IN | BODY MASS INDEX: 22.73 KG/M2 | WEIGHT: 150 LBS

## 2020-09-22 DIAGNOSIS — Z12.11 ENCOUNTER FOR SCREENING FOR MALIGNANT NEOPLASM OF COLON: ICD-10-CM

## 2020-09-22 DIAGNOSIS — D12.6 ADENOMATOUS POLYP OF COLON, UNSPECIFIED PART OF COLON: ICD-10-CM

## 2020-09-22 DIAGNOSIS — Z80.0 FH: COLON CANCER: ICD-10-CM

## 2020-09-22 PROCEDURE — 25010000002 ONDANSETRON PER 1 MG: Performed by: ANESTHESIOLOGY

## 2020-09-22 PROCEDURE — S0260 H&P FOR SURGERY: HCPCS | Performed by: INTERNAL MEDICINE

## 2020-09-22 PROCEDURE — 25010000002 PROPOFOL 10 MG/ML EMULSION: Performed by: NURSE ANESTHETIST, CERTIFIED REGISTERED

## 2020-09-22 PROCEDURE — 45380 COLONOSCOPY AND BIOPSY: CPT | Performed by: INTERNAL MEDICINE

## 2020-09-22 PROCEDURE — 88305 TISSUE EXAM BY PATHOLOGIST: CPT | Performed by: INTERNAL MEDICINE

## 2020-09-22 RX ORDER — SODIUM CHLORIDE, SODIUM LACTATE, POTASSIUM CHLORIDE, CALCIUM CHLORIDE 600; 310; 30; 20 MG/100ML; MG/100ML; MG/100ML; MG/100ML
30 INJECTION, SOLUTION INTRAVENOUS CONTINUOUS PRN
Status: DISCONTINUED | OUTPATIENT
Start: 2020-09-22 | End: 2020-09-22 | Stop reason: HOSPADM

## 2020-09-22 RX ORDER — SODIUM CHLORIDE 0.9 % (FLUSH) 0.9 %
10 SYRINGE (ML) INJECTION AS NEEDED
Status: DISCONTINUED | OUTPATIENT
Start: 2020-09-22 | End: 2020-09-22 | Stop reason: HOSPADM

## 2020-09-22 RX ORDER — PROPOFOL 10 MG/ML
VIAL (ML) INTRAVENOUS CONTINUOUS PRN
Status: DISCONTINUED | OUTPATIENT
Start: 2020-09-22 | End: 2020-09-22 | Stop reason: SURG

## 2020-09-22 RX ORDER — ONDANSETRON 2 MG/ML
4 INJECTION INTRAMUSCULAR; INTRAVENOUS ONCE
Status: COMPLETED | OUTPATIENT
Start: 2020-09-22 | End: 2020-09-22

## 2020-09-22 RX ORDER — PROPOFOL 10 MG/ML
VIAL (ML) INTRAVENOUS AS NEEDED
Status: DISCONTINUED | OUTPATIENT
Start: 2020-09-22 | End: 2020-09-22 | Stop reason: SURG

## 2020-09-22 RX ORDER — FAMOTIDINE 10 MG/ML
20 INJECTION, SOLUTION INTRAVENOUS ONCE
Status: COMPLETED | OUTPATIENT
Start: 2020-09-22 | End: 2020-09-22

## 2020-09-22 RX ORDER — LIDOCAINE HYDROCHLORIDE 20 MG/ML
INJECTION, SOLUTION INFILTRATION; PERINEURAL AS NEEDED
Status: DISCONTINUED | OUTPATIENT
Start: 2020-09-22 | End: 2020-09-22 | Stop reason: SURG

## 2020-09-22 RX ORDER — SODIUM CHLORIDE 0.9 % (FLUSH) 0.9 %
3 SYRINGE (ML) INJECTION EVERY 12 HOURS SCHEDULED
Status: DISCONTINUED | OUTPATIENT
Start: 2020-09-22 | End: 2020-09-22 | Stop reason: HOSPADM

## 2020-09-22 RX ADMIN — ONDANSETRON 4 MG: 2 INJECTION INTRAMUSCULAR; INTRAVENOUS at 10:25

## 2020-09-22 RX ADMIN — PROPOFOL 60 MG: 10 INJECTION, EMULSION INTRAVENOUS at 11:00

## 2020-09-22 RX ADMIN — LIDOCAINE HYDROCHLORIDE 60 MG: 20 INJECTION, SOLUTION INFILTRATION; PERINEURAL at 11:00

## 2020-09-22 RX ADMIN — PROPOFOL 140 MCG/KG/MIN: 10 INJECTION, EMULSION INTRAVENOUS at 11:00

## 2020-09-22 RX ADMIN — FAMOTIDINE 20 MG: 10 INJECTION INTRAVENOUS at 10:25

## 2020-09-22 RX ADMIN — SODIUM CHLORIDE, POTASSIUM CHLORIDE, SODIUM LACTATE AND CALCIUM CHLORIDE 30 ML/HR: 600; 310; 30; 20 INJECTION, SOLUTION INTRAVENOUS at 10:16

## 2020-09-22 NOTE — ANESTHESIA PREPROCEDURE EVALUATION
Anesthesia Evaluation     Patient summary reviewed and Nursing notes reviewed   history of anesthetic complications: PONV               Airway   Mallampati: I  TM distance: >3 FB  Neck ROM: full  No difficulty expected  Dental - normal exam     Pulmonary - normal exam   (+) a smoker Former,   Cardiovascular - normal exam    ECG reviewed    (+) hypertension,     ROS comment: Borderline QT interval    Neuro/Psych  (+) syncope,     GI/Hepatic/Renal/Endo - negative ROS     Musculoskeletal (-) negative ROS    Abdominal  - normal exam    Bowel sounds: normal.   Substance History - negative use     OB/GYN negative ob/gyn ROS         Other                      Anesthesia Plan    ASA 3     MAC       Anesthetic plan, all risks, benefits, and alternatives have been provided, discussed and informed consent has been obtained with: patient.

## 2020-09-22 NOTE — ANESTHESIA POSTPROCEDURE EVALUATION
"Patient: Anabell Damian    Procedure Summary     Date: 09/22/20 Room / Location:  [facility]    Anesthesia Start: 1055 Anesthesia Stop: 1139    Procedure: COLONOSCOPY into cecum with cold biopsy polypectomies (N/A ) Diagnosis:       Adenomatous polyp of colon, unspecified part of colon      FH: colon cancer      Encounter for screening for malignant neoplasm of colon      (Adenomatous polyp of colon, unspecified part of colon [D12.6])      (FH: colon cancer [Z80.0])      (Encounter for screening for malignant neoplasm of colon [Z12.11])    Surgeon: Rosanne Borden MD Provider: Tramaine Eric MD    Anesthesia Type: MAC ASA Status: 3          Anesthesia Type: MAC    Vitals  Vitals Value Taken Time   /69 09/22/20 1149   Temp     Pulse 58 09/22/20 1149   Resp     SpO2 98 % 09/22/20 1149           Post Anesthesia Care and Evaluation    Patient location during evaluation: PACU  Patient participation: complete - patient participated  Level of consciousness: awake  Pain score: 0  Pain management: adequate  Airway patency: patent  Anesthetic complications: No anesthetic complications  PONV Status: none  Cardiovascular status: acceptable  Respiratory status: acceptable  Hydration status: acceptable    Comments: /69 (BP Location: Left arm, Patient Position: Lying)   Pulse 58   Resp 16   Ht 172.7 cm (68\")   Wt 68 kg (150 lb)   SpO2 98%   BMI 22.81 kg/m²       "

## 2020-09-22 NOTE — H&P
[facility]  Pre Procedure History & Physical    Chief Complaint:   History of adenomatous polyps    Subjective     HPI:   She has a history of adenomatous colon polyps and a family history with a paternal aunt with colon cancer.   Last exam 2017-tubular adenoma x5 removed    Past Medical History:   Past Medical History:   Diagnosis Date   • Adenomatous polyp of colon 9/2/2020   • Family hx of colon cancer    • Hypertension    • Hypothyroidism    • PONV (postoperative nausea and vomiting)        Past Surgical History:  Past Surgical History:   Procedure Laterality Date   • BREAST CYST ASPIRATION     • COLONOSCOPY  06/21/2006   • COLONOSCOPY N/A 10/18/2017    Procedure: COLONOSCOPY into cecum with cold biopsy polypectomy and hot snare polypectomies;  Surgeon: Rosanne Borden MD;  Location: [facility] ENDOSCOPY;  Service:    • HEMORRHOIDECTOMY     • OTHER SURGICAL HISTORY      rectocele repair   • TUBAL ABDOMINAL LIGATION  1982       Family History:  Family History   Problem Relation Age of Onset   • Colon cancer Paternal Aunt        Social History:   reports that she quit smoking about 37 years ago. She smoked 1.00 pack per day. She has never used smokeless tobacco. She reports current alcohol use. She reports that she does not use drugs.    Medications:   Medications Prior to Admission   Medication Sig Dispense Refill Last Dose   • APPLE CIDER VINEGAR PO Take  by mouth.   9/21/2020 at Unknown time   • BIOTIN PO Take  by mouth.   9/21/2020 at Unknown time   • Cholecalciferol (VITAMIN D3) 75 MCG (3000 UT) tablet Take 1 tablet by mouth Daily.   9/21/2020 at Unknown time   • CRANBERRY PO Take 1,680 mg by mouth Daily.   9/21/2020 at Unknown time   • cyanocobalamin (VITAMIN B-12) 500 MCG tablet Take 500 mcg by mouth Daily.   9/21/2020 at Unknown time   • famotidine (PEPCID) 40 MG tablet Take 40 mg by mouth every night at bedtime.   9/21/2020 at Unknown time   • fexofenadine (ALLEGRA) 180 MG tablet  "Take 180 mg by mouth Daily.   9/21/2020 at Unknown time   • fluticasone (FLONASE) 50 MCG/ACT nasal spray 2 sprays into the nostril(s) as directed by provider Daily.   9/21/2020 at Unknown time   • GARLIC PO Take  by mouth.   9/21/2020 at Unknown time   • levothyroxine (SYNTHROID, LEVOTHROID) 88 MCG tablet 88 mcg Daily.   9/21/2020 at Unknown time   • MAGNESIUM PO Take  by mouth.   9/21/2020 at Unknown time   • montelukast (SINGULAIR) 10 MG tablet Take 10 mg by mouth Daily.   9/21/2020 at Unknown time   • Omega-3 Fatty Acids (FISH OIL) 1000 MG capsule capsule Take  by mouth Daily.   9/21/2020 at Unknown time   • PROBIOTIC PRODUCT PO Take  by mouth.   9/21/2020 at Unknown time   • raNITIdine (ZANTAC) 150 MG tablet Take 150 mg by mouth Daily.   9/21/2020 at Unknown time   • spironolactone (ALDACTONE) 25 MG tablet Take 25 mg by mouth Daily.   9/21/2020 at Unknown time   • vitamin B-6 (PYRIDOXINE) 100 MG tablet Take 100 mg by mouth Daily.   9/21/2020 at Unknown time   • vitamin C (ASCORBIC ACID) 500 MG tablet Take 4,000 mg by mouth Daily.   9/21/2020 at Unknown time       Allergies:  Cefdinir, Hydrocodone, Levofloxacin, Lisinopril, and Penicillins    ROS:    Pertinent items are noted in HPI, all other systems reviewed and negative     Objective     Blood pressure 143/79, pulse 65, resp. rate 16, height 172.7 cm (68\"), weight 68 kg (150 lb), SpO2 96 %.    Physical Exam   Constitutional: Pt is oriented to person, place, and time and well-developed, well-nourished, and in no distress.   Mouth/Throat: Oropharynx is clear and moist.   Neck: Normal range of motion.   Cardiovascular: Normal rate, regular rhythm    Pulmonary/Chest: Effort normal    Abdominal: Soft. Nontender  Skin: Skin is warm and dry.   Psychiatric: Mood, memory, affect and judgment normal.     Assessment/Plan     Diagnosis:  History of adenomatous polyps    Anticipated Surgical Procedure:  colonoscopy    The risks, benefits, and alternatives of this procedure " have been discussed with the patient or the responsible party- the patient understands and agrees to proceed.

## 2020-09-23 LAB
CYTO UR: NORMAL
LAB AP CASE REPORT: NORMAL
PATH REPORT.FINAL DX SPEC: NORMAL
PATH REPORT.GROSS SPEC: NORMAL

## 2020-09-24 ENCOUNTER — TELEPHONE (OUTPATIENT)
Dept: GASTROENTEROLOGY | Facility: CLINIC | Age: 75
End: 2020-09-24

## 2021-02-08 ENCOUNTER — APPOINTMENT (OUTPATIENT)
Dept: VACCINE CLINIC | Facility: HOSPITAL | Age: 76
End: 2021-02-08

## 2021-03-09 DIAGNOSIS — Z23 IMMUNIZATION DUE: ICD-10-CM

## 2021-08-16 ENCOUNTER — TRANSCRIBE ORDERS (OUTPATIENT)
Dept: ADMINISTRATIVE | Facility: HOSPITAL | Age: 76
End: 2021-08-16

## 2021-08-16 DIAGNOSIS — Z12.31 VISIT FOR SCREENING MAMMOGRAM: Primary | ICD-10-CM

## 2021-09-02 ENCOUNTER — TRANSCRIBE ORDERS (OUTPATIENT)
Dept: ADMINISTRATIVE | Facility: HOSPITAL | Age: 76
End: 2021-09-02

## 2021-09-02 DIAGNOSIS — Z13.820 OSTEOPOROSIS SCREENING: ICD-10-CM

## 2021-09-02 DIAGNOSIS — N95.1 MENOPAUSAL STATE: Primary | ICD-10-CM

## 2021-09-24 ENCOUNTER — HOSPITAL ENCOUNTER (OUTPATIENT)
Dept: MAMMOGRAPHY | Facility: HOSPITAL | Age: 76
Discharge: HOME | End: 2021-09-24
Admitting: INTERNAL MEDICINE

## 2021-09-24 DIAGNOSIS — Z12.31 VISIT FOR SCREENING MAMMOGRAM: ICD-10-CM

## 2021-09-24 PROCEDURE — 77067 SCR MAMMO BI INCL CAD: CPT

## 2021-09-24 PROCEDURE — 77063 BREAST TOMOSYNTHESIS BI: CPT

## 2022-09-02 ENCOUNTER — TRANSCRIBE ORDERS (OUTPATIENT)
Dept: ADMINISTRATIVE | Facility: HOSPITAL | Age: 77
End: 2022-09-02

## 2022-09-02 DIAGNOSIS — Z12.31 VISIT FOR SCREENING MAMMOGRAM: Primary | ICD-10-CM

## 2022-09-29 ENCOUNTER — HOSPITAL ENCOUNTER (OUTPATIENT)
Dept: MAMMOGRAPHY | Facility: HOSPITAL | Age: 77
Discharge: HOME | End: 2022-09-29
Admitting: INTERNAL MEDICINE

## 2022-09-29 DIAGNOSIS — Z12.31 VISIT FOR SCREENING MAMMOGRAM: ICD-10-CM

## 2022-09-29 PROCEDURE — 77063 BREAST TOMOSYNTHESIS BI: CPT

## 2022-09-29 PROCEDURE — 77067 SCR MAMMO BI INCL CAD: CPT

## 2022-10-24 ENCOUNTER — TELEPHONE (OUTPATIENT)
Dept: GASTROENTEROLOGY | Facility: CLINIC | Age: 77
End: 2022-10-24

## 2022-10-24 NOTE — TELEPHONE ENCOUNTER
Caller: Anabell Damian    Relationship to patient: Self    Best call back number: [phone]    Chief complaint: SCHEDULING    Type of visit: COLONOSCOPY    Requested date: PT WOULD LIKE TO SCHEDULE IN SEPT OF 2023    Additional notes:PT CALLED TO SCHEDULE

## 2022-10-25 ENCOUNTER — PRE-PROCEDURE SCREENING (OUTPATIENT)
Dept: GASTROENTEROLOGY | Facility: CLINIC | Age: 77
End: 2022-10-25

## 2022-10-25 NOTE — TELEPHONE ENCOUNTER
Mailed OA questionnaire to patient to complete for screening. Will defer for 30days and notify referring physician if not received

## 2022-11-15 ENCOUNTER — APPOINTMENT (OUTPATIENT)
Dept: CT IMAGING | Facility: HOSPITAL | Age: 77
End: 2022-11-15

## 2022-11-15 ENCOUNTER — HOSPITAL ENCOUNTER (INPATIENT)
Facility: HOSPITAL | Age: 77
LOS: 2 days | Discharge: HOME | End: 2022-11-17
Attending: EMERGENCY MEDICINE | Admitting: INTERNAL MEDICINE

## 2022-11-15 DIAGNOSIS — T79.6XXA TRAUMATIC RHABDOMYOLYSIS, INITIAL ENCOUNTER: ICD-10-CM

## 2022-11-15 DIAGNOSIS — R55 SYNCOPE AND COLLAPSE: Primary | ICD-10-CM

## 2022-11-15 DIAGNOSIS — R56.9 SEIZURE-LIKE ACTIVITY: ICD-10-CM

## 2022-11-15 LAB
ALBUMIN SERPL-MCNC: 4.2 G/DL (ref 3.5–5.2)
ALBUMIN/GLOB SERPL: 1.6 G/DL
ALP SERPL-CCNC: 105 U/L (ref 39–117)
ALT SERPL W P-5'-P-CCNC: 47 U/L (ref 1–33)
ANION GAP SERPL CALCULATED.3IONS-SCNC: 11.2 MMOL/L (ref 5–15)
AST SERPL-CCNC: 74 U/L (ref 1–32)
BASOPHILS # BLD AUTO: 0.03 10*3/MM3 (ref 0–0.2)
BASOPHILS NFR BLD AUTO: 0.3 % (ref 0–1.5)
BILIRUB SERPL-MCNC: 0.5 MG/DL (ref 0–1.2)
BILIRUB UR QL STRIP: NEGATIVE
BUN SERPL-MCNC: 26 MG/DL (ref 8–23)
BUN/CREAT SERPL: 24.3 (ref 7–25)
CALCIUM SPEC-SCNC: 9.7 MG/DL (ref 8.6–10.5)
CHLORIDE SERPL-SCNC: 100 MMOL/L (ref 98–107)
CK SERPL-CCNC: 913 U/L (ref 20–180)
CLARITY UR: CLEAR
CO2 SERPL-SCNC: 23.8 MMOL/L (ref 22–29)
COLOR UR: YELLOW
CREAT SERPL-MCNC: 1.07 MG/DL (ref 0.57–1)
DEPRECATED RDW RBC AUTO: 45 FL (ref 37–54)
EGFRCR SERPLBLD CKD-EPI 2021: 52.9 ML/MIN/1.73
EOSINOPHIL # BLD AUTO: 0.18 10*3/MM3 (ref 0–0.4)
EOSINOPHIL NFR BLD AUTO: 2 % (ref 0.3–6.2)
ERYTHROCYTE [DISTWIDTH] IN BLOOD BY AUTOMATED COUNT: 13.7 % (ref 12.3–15.4)
GLOBULIN UR ELPH-MCNC: 2.7 GM/DL
GLUCOSE SERPL-MCNC: 137 MG/DL (ref 65–99)
GLUCOSE UR STRIP-MCNC: NEGATIVE MG/DL
HCT VFR BLD AUTO: 36.3 % (ref 34–46.6)
HGB BLD-MCNC: 11.6 G/DL (ref 12–15.9)
HGB UR QL STRIP.AUTO: NEGATIVE
HOLD SPECIMEN: NORMAL
HOLD SPECIMEN: NORMAL
IMM GRANULOCYTES # BLD AUTO: 0.03 10*3/MM3 (ref 0–0.05)
IMM GRANULOCYTES NFR BLD AUTO: 0.3 % (ref 0–0.5)
KETONES UR QL STRIP: ABNORMAL
LEUKOCYTE ESTERASE UR QL STRIP.AUTO: NEGATIVE
LYMPHOCYTES # BLD AUTO: 1.66 10*3/MM3 (ref 0.7–3.1)
LYMPHOCYTES NFR BLD AUTO: 18.1 % (ref 19.6–45.3)
MCH RBC QN AUTO: 28.9 PG (ref 26.6–33)
MCHC RBC AUTO-ENTMCNC: 32 G/DL (ref 31.5–35.7)
MCV RBC AUTO: 90.3 FL (ref 79–97)
MONOCYTES # BLD AUTO: 0.64 10*3/MM3 (ref 0.1–0.9)
MONOCYTES NFR BLD AUTO: 7 % (ref 5–12)
NEUTROPHILS NFR BLD AUTO: 6.61 10*3/MM3 (ref 1.7–7)
NEUTROPHILS NFR BLD AUTO: 72.3 % (ref 42.7–76)
NITRITE UR QL STRIP: NEGATIVE
NRBC BLD AUTO-RTO: 0.1 /100 WBC (ref 0–0.2)
PH UR STRIP.AUTO: 6.5 [PH] (ref 5–8)
PLATELET # BLD AUTO: 266 10*3/MM3 (ref 140–450)
PMV BLD AUTO: 9.4 FL (ref 6–12)
POTASSIUM SERPL-SCNC: 4.3 MMOL/L (ref 3.5–5.2)
PROT SERPL-MCNC: 6.9 G/DL (ref 6–8.5)
PROT UR QL STRIP: NEGATIVE
QT INTERVAL: 402 MS
QT INTERVAL: 409 MS
RBC # BLD AUTO: 4.02 10*6/MM3 (ref 3.77–5.28)
SODIUM SERPL-SCNC: 135 MMOL/L (ref 136–145)
SP GR UR STRIP: 1.02 (ref 1–1.03)
TROPONIN T SERPL-MCNC: 0.01 NG/ML (ref 0–0.03)
TROPONIN T SERPL-MCNC: 0.01 NG/ML (ref 0–0.03)
URINE MYOGLOBIN, QUALITATIVE: NEGATIVE
UROBILINOGEN UR QL STRIP: ABNORMAL
WBC NRBC COR # BLD: 9.15 10*3/MM3 (ref 3.4–10.8)
WHOLE BLOOD HOLD COAG: NORMAL
WHOLE BLOOD HOLD SPECIMEN: NORMAL

## 2022-11-15 PROCEDURE — 93005 ELECTROCARDIOGRAM TRACING: CPT | Performed by: INTERNAL MEDICINE

## 2022-11-15 PROCEDURE — 93010 ELECTROCARDIOGRAM REPORT: CPT | Performed by: INTERNAL MEDICINE

## 2022-11-15 PROCEDURE — 85025 COMPLETE CBC W/AUTO DIFF WBC: CPT

## 2022-11-15 PROCEDURE — 99284 EMERGENCY DEPT VISIT MOD MDM: CPT

## 2022-11-15 PROCEDURE — 80053 COMPREHEN METABOLIC PANEL: CPT

## 2022-11-15 PROCEDURE — 82550 ASSAY OF CK (CPK): CPT | Performed by: EMERGENCY MEDICINE

## 2022-11-15 PROCEDURE — 81003 URINALYSIS AUTO W/O SCOPE: CPT | Performed by: EMERGENCY MEDICINE

## 2022-11-15 PROCEDURE — 84484 ASSAY OF TROPONIN QUANT: CPT

## 2022-11-15 PROCEDURE — 70450 CT HEAD/BRAIN W/O DYE: CPT

## 2022-11-15 PROCEDURE — 83874 ASSAY OF MYOGLOBIN: CPT | Performed by: EMERGENCY MEDICINE

## 2022-11-15 PROCEDURE — 84484 ASSAY OF TROPONIN QUANT: CPT | Performed by: EMERGENCY MEDICINE

## 2022-11-15 PROCEDURE — 93005 ELECTROCARDIOGRAM TRACING: CPT | Performed by: EMERGENCY MEDICINE

## 2022-11-15 RX ORDER — UREA 10 %
3 LOTION (ML) TOPICAL NIGHTLY PRN
Status: DISCONTINUED | OUTPATIENT
Start: 2022-11-15 | End: 2022-11-17

## 2022-11-15 RX ORDER — NITROGLYCERIN 0.4 MG/1
0.4 TABLET SUBLINGUAL
Status: DISCONTINUED | OUTPATIENT
Start: 2022-11-15 | End: 2022-11-17

## 2022-11-15 RX ORDER — CETIRIZINE HYDROCHLORIDE 10 MG/1
10 TABLET ORAL DAILY
COMMUNITY

## 2022-11-15 RX ORDER — ACETAMINOPHEN 325 MG/1
650 TABLET ORAL EVERY 4 HOURS PRN
Status: DISCONTINUED | OUTPATIENT
Start: 2022-11-15 | End: 2022-11-17 | Stop reason: HOSPADM

## 2022-11-15 RX ORDER — MELATONIN
1000 DAILY
Status: DISCONTINUED | OUTPATIENT
Start: 2022-11-16 | End: 2022-11-17 | Stop reason: HOSPADM

## 2022-11-15 RX ORDER — CETIRIZINE HYDROCHLORIDE 10 MG/1
10 TABLET ORAL DAILY
Status: DISCONTINUED | OUTPATIENT
Start: 2022-11-16 | End: 2022-11-17 | Stop reason: HOSPADM

## 2022-11-15 RX ORDER — LANOLIN ALCOHOL/MO/W.PET/CERES
100 CREAM (GRAM) TOPICAL DAILY
Status: DISCONTINUED | OUTPATIENT
Start: 2022-11-16 | End: 2022-11-17 | Stop reason: HOSPADM

## 2022-11-15 RX ORDER — ASCORBIC ACID 500 MG
1500 TABLET ORAL DAILY
Status: DISCONTINUED | OUTPATIENT
Start: 2022-11-16 | End: 2022-11-17 | Stop reason: HOSPADM

## 2022-11-15 RX ORDER — SODIUM CHLORIDE 9 MG/ML
75 INJECTION, SOLUTION INTRAVENOUS CONTINUOUS
Status: DISCONTINUED | OUTPATIENT
Start: 2022-11-15 | End: 2022-11-16

## 2022-11-15 RX ORDER — FAMOTIDINE 20 MG/1
40 TABLET, FILM COATED ORAL NIGHTLY
Status: DISCONTINUED | OUTPATIENT
Start: 2022-11-15 | End: 2022-11-17

## 2022-11-15 RX ORDER — UREA 10 %
27 LOTION (ML) TOPICAL DAILY
Status: DISCONTINUED | OUTPATIENT
Start: 2022-11-16 | End: 2022-11-17 | Stop reason: HOSPADM

## 2022-11-15 RX ORDER — L.ACID,PARA/B.BIFIDUM/S.THERM 8B CELL
1 CAPSULE ORAL DAILY
Status: DISCONTINUED | OUTPATIENT
Start: 2022-11-16 | End: 2022-11-17 | Stop reason: HOSPADM

## 2022-11-15 RX ORDER — CHOLECALCIFEROL (VITAMIN D3) 125 MCG
500 CAPSULE ORAL DAILY
Status: DISCONTINUED | OUTPATIENT
Start: 2022-11-16 | End: 2022-11-17 | Stop reason: HOSPADM

## 2022-11-15 RX ORDER — LEVOTHYROXINE SODIUM 88 UG/1
88 TABLET ORAL
Status: DISCONTINUED | OUTPATIENT
Start: 2022-11-16 | End: 2022-11-17 | Stop reason: HOSPADM

## 2022-11-15 RX ORDER — ONDANSETRON 2 MG/ML
4 INJECTION INTRAMUSCULAR; INTRAVENOUS EVERY 6 HOURS PRN
Status: DISCONTINUED | OUTPATIENT
Start: 2022-11-15 | End: 2022-11-17

## 2022-11-15 RX ORDER — SPIRONOLACTONE 25 MG/1
25 TABLET ORAL DAILY
Status: DISCONTINUED | OUTPATIENT
Start: 2022-11-16 | End: 2022-11-17 | Stop reason: HOSPADM

## 2022-11-15 RX ORDER — ONDANSETRON 4 MG/1
4 TABLET, FILM COATED ORAL EVERY 6 HOURS PRN
Status: DISCONTINUED | OUTPATIENT
Start: 2022-11-15 | End: 2022-11-17

## 2022-11-15 RX ADMIN — FAMOTIDINE 40 MG: 20 TABLET ORAL at 21:47

## 2022-11-15 RX ADMIN — SODIUM CHLORIDE 1000 ML: 9 INJECTION, SOLUTION INTRAVENOUS at 16:24

## 2022-11-15 RX ADMIN — SODIUM CHLORIDE 75 ML/HR: 9 INJECTION, SOLUTION INTRAVENOUS at 21:48

## 2022-11-15 NOTE — ED TRIAGE NOTES
Call received from pt's PCP. States that on Sunday she fell and was unable to get up for several hours (almost 10). Reports that she had N/V and syncopal episodes while on the floor. Pt finally got herself up. Went to her PCP on Monday and had labs drawn. Was called and told her troponin was elevated. Pt is currently A/Ox4, ambulatory in triage, and has no complaints.     Patient masked at arrival and triage staff wore all appropriate PPE during entire encounter with patient.

## 2022-11-15 NOTE — ED NOTES
"Nursing report ED to floor  Anabell Damian  77 y.o.  female    HPI :   Chief Complaint   Patient presents with    Abnormal Lab       Admitting doctor:   Rosa Escobar MD    Admitting diagnosis:   The primary encounter diagnosis was Syncope and collapse. Diagnoses of Traumatic rhabdomyolysis, initial encounter (HCC) and Seizure-like activity (HCC) were also pertinent to this visit.    Code status:   Current Code Status       Date Active Code Status Order ID Comments User Context       11/15/2022 1812 CPR (Attempt to Resuscitate) [id number]  Rosa Escobar MD ED        Question Answer    Code Status (Patient has no pulse and is not breathing) CPR (Attempt to Resuscitate)    Medical Interventions (Patient has pulse or is breathing) Full                    Allergies:   Cefdinir, Hydrocodone, Levofloxacin, Lisinopril, and Penicillins    Isolation:   No active isolations    Intake and Output  No intake or output data in the 24 hours ending 11/15/22 1824    Weight:       11/15/22  1442   Weight: 67.1 kg (148 lb)       Most recent vitals:   Vitals:    11/15/22 1442 11/15/22 1444 11/15/22 1741 11/15/22 1803   BP:  154/80 154/72    Pulse:    68   Resp:       Temp:       SpO2:       Weight: 67.1 kg (148 lb)      Height: 172.7 cm (68\")          Active LDAs/IV Access:   Lines, Drains & Airways       Active LDAs       Name Placement date Placement time Site Days    Peripheral IV 11/15/22 1624 Anterior;Distal;Right Forearm 11/15/22  1624  Forearm  less than 1                    Labs (abnormal labs have a star):   Labs Reviewed   COMPREHENSIVE METABOLIC PANEL - Abnormal; Notable for the following components:       Result Value    Glucose 137 (*)     BUN 26 (*)     Creatinine 1.07 (*)     Sodium 135 (*)     ALT (SGPT) 47 (*)     AST (SGOT) 74 (*)     eGFR 52.9 (*)     All other components within normal limits    Narrative:     GFR Normal >60  Chronic Kidney Disease <60  Kidney Failure <15    The GFR formula is only " valid for adults with stable renal function between ages 18 and 70.   CBC WITH AUTO DIFFERENTIAL - Abnormal; Notable for the following components:    Hemoglobin 11.6 (*)     Lymphocyte % 18.1 (*)     All other components within normal limits   URINALYSIS W/ MICROSCOPIC IF INDICATED (NO CULTURE) - Abnormal; Notable for the following components:    Ketones, UA Trace (*)     All other components within normal limits    Narrative:     Urine microscopic not indicated.   CK - Abnormal; Notable for the following components:    Creatine Kinase 913 (*)     All other components within normal limits   TROPONIN (IN-HOUSE) - Normal    Narrative:     Troponin T Reference Range:  <= 0.03 ng/mL-   Negative for AMI  >0.03 ng/mL-     Abnormal for myocardial necrosis.  Clinicians would have to utilize clinical acumen, EKG, Troponin and serial changes to determine if it is an Acute Myocardial Infarction or myocardial injury due to an underlying chronic condition.       Results may be falsely decreased if patient taking Biotin.     TROPONIN (IN-HOUSE) - Normal    Narrative:     Troponin T Reference Range:  <= 0.03 ng/mL-   Negative for AMI  >0.03 ng/mL-     Abnormal for myocardial necrosis.  Clinicians would have to utilize clinical acumen, EKG, Troponin and serial changes to determine if it is an Acute Myocardial Infarction or myocardial injury due to an underlying chronic condition.       Results may be falsely decreased if patient taking Biotin.     MYOGLOBIN SCREEN, URINE - Normal   RAINBOW DRAW    Narrative:     The following orders were created for panel order [facility] Draw.  Procedure                               Abnormality         Status                     ---------                               -----------         ------                     Green Top (Gel)[[id number]]                                  Final result               Lavender Top[[id number]]                                     Final result               Gold Top -  SST[[id number]]                                   Final result               Light Blue Top[[id number]]                                   Final result                 Please view results for these tests on the individual orders.   CBC AND DIFFERENTIAL    Narrative:     The following orders were created for panel order CBC & Differential.  Procedure                               Abnormality         Status                     ---------                               -----------         ------                     CBC Auto Differential[[id number]]        Abnormal            Final result                 Please view results for these tests on the individual orders.   GREEN TOP   LAVENDER TOP   GOLD TOP - SST   LIGHT BLUE TOP       EKG:   ECG 12 Lead Syncope   Final Result   HEART RATE= 64  bpm   RR Interval= 938  ms   AK Interval= 175  ms   P Horizontal Axis= -48  deg   P Front Axis= 95  deg   QRSD Interval= 89  ms   QT Interval= 409  ms   QRS Axis= 51  deg   T Wave Axis= 64  deg   - ABNORMAL ECG -   Sinus rhythm   Probable left atrial enlargement   Probable left ventricular hypertrophy   Since prior tracing  hr has decreased   Electronically Signed By: London Ponce ([facility]) 15-Nov-2022 17:21:16   Date and Time of Study: 2022-11-15 16:32:23          Meds given in ED:   Medications   nitroglycerin (NITROSTAT) SL tablet 0.4 mg (has no administration in time range)   acetaminophen (TYLENOL) tablet 650 mg (has no administration in time range)   ondansetron (ZOFRAN) tablet 4 mg (has no administration in time range)     Or   ondansetron (ZOFRAN) injection 4 mg (has no administration in time range)   melatonin tablet 3 mg (has no administration in time range)   sodium chloride 0.9 % bolus 1,000 mL (1,000 mL Intravenous New Bag 11/15/22 1624)       Imaging results:  CT Head Without Contrast    Result Date: 11/15/2022   No acute intracranial hemorrhage or hydrocephalus. If there is further clinical concern, MRI could be considered  for further evaluation.  This report was finalized on 11/15/2022 5:08 PM by Dr. Maxi Hardwick M.D.       Ambulatory status:   - ambulatory    Social issues:   Social History     Socioeconomic History    Marital status:    Tobacco Use    Smoking status: Former     Packs/day: 1.00     Types: Cigarettes     Quit date:      Years since quittin.8    Smokeless tobacco: Never   Vaping Use    Vaping Use: Never used   Substance and Sexual Activity    Alcohol use: Yes     Comment: 4-5 drinks per week    Drug use: No    Sexual activity: Defer       NIH Stroke Scale:         Kishan Gonzalez RN  11/15/22 18:24 EST

## 2022-11-15 NOTE — ED PROVIDER NOTES
EMERGENCY DEPARTMENT ENCOUNTER    Room Number:  32/32  Date of encounter:  11/15/2022  PCP: Jennifer Lauren MD  Historian: Patient      HPI:  Chief Complaint: Syncope versus seizure    A complete HPI/ROS/PMH/PSH/SH/FH are unobtainable due to: N/A    Context: Anabell Damian is a 77 y.o. female who presents to the ED c/o syncopal episode versus seizure-she states that Sunday morning, she got up from bed and passed out on the way to the bathroom.  Her  states that she was on the floor and was briefly unresponsive.  She did lose control of her bladder and bit her tongue during this.  He states that her eyes were very wide and her arms were stiff, there did not seem to be any significant postictal period -he states that she came to rather quickly.  However, she was unable to get up off the floor for approximately 7 hours-every time she would try to sit up, she would have violent retching and nausea.  She had several more similar episodes with her arms stiffening and eyes being wide.  Her  did not report any generalized tonic-clonic activity however.  She was eventually able to crawl to the bathroom and have a bowel movement and seem to be little bit better after that.  No recent head injuries.  No fevers or chills.  No chest pain.  No palpitations.  She went to her primary care doctor yesterday and had labs drawn and was sent to the emergency department today for further evaluation.    Summary of prior records: She had COVID in July.  She has history of hypertension and hypothyroidism.  No recent ER visits or emergency department visits at [facility].    The patient was placed in a mask in triage, hand hygiene was performed before and after my interaction with the patient.  I wore a mask, safety glasses and gloves during my entire interaction with the patient.    PAST MEDICAL HISTORY  Active Ambulatory Problems     Diagnosis Date Noted   • Syncope and collapse 08/30/2017   • Essential  hypertension 2017   • Benign paroxysmal positional vertigo due to bilateral vestibular disorder 2017   • N&V (nausea and vomiting) 2017   • Adenomatous polyp of colon 2020   • FH: colon cancer 2020   • Encounter for screening for malignant neoplasm of colon 2020     Resolved Ambulatory Problems     Diagnosis Date Noted   • No Resolved Ambulatory Problems     Past Medical History:   Diagnosis Date   • Family hx of colon cancer    • Hypertension    • Hypothyroidism    • PONV (postoperative nausea and vomiting)          PAST SURGICAL HISTORY  Past Surgical History:   Procedure Laterality Date   • BREAST CYST ASPIRATION     • COLONOSCOPY  2006   • COLONOSCOPY N/A 10/18/2017    Procedure: COLONOSCOPY into cecum with cold biopsy polypectomy and hot snare polypectomies;  Surgeon: Rosanne Borden MD;  Location:  [facility];  Service:    • COLONOSCOPY N/A 2020    Procedure: COLONOSCOPY into cecum with cold biopsy polypectomies;  Surgeon: Rosanne Borden MD;  Location:  [facility];  Service: Gastroenterology;  Laterality: N/A;  Pre op: History of Polyps  Post op: Diverticulosis, Polyps   • HEMORRHOIDECTOMY     • OTHER SURGICAL HISTORY      rectocele repair   • TUBAL ABDOMINAL LIGATION           FAMILY HISTORY  Family History   Problem Relation Age of Onset   • Colon cancer Paternal Aunt    • Breast cancer Neg Hx          SOCIAL HISTORY  Social History     Socioeconomic History   • Marital status:    Tobacco Use   • Smoking status: Former     Packs/day: 1.00     Types: Cigarettes     Quit date:      Years since quittin.8   • Smokeless tobacco: Never   Vaping Use   • Vaping Use: Never used   Substance and Sexual Activity   • Alcohol use: Yes     Comment: 4-5 drinks per week   • Drug use: No   • Sexual activity: Defer         ALLERGIES  Cefdinir, Hydrocodone, Levofloxacin, Lisinopril, and Penicillins        REVIEW OF SYSTEMS  Review of Systems    Constitutional: Negative for chills and fever.   Respiratory: Negative for chest tightness.    Cardiovascular: Negative for chest pain.   Gastrointestinal: Positive for nausea and vomiting. Negative for abdominal pain.   Neurological: Positive for seizures and syncope.        All systems reviewed and negative except for those discussed in HPI.       PHYSICAL EXAM    I have reviewed the triage vital signs and nursing notes.    ED Triage Vitals   Temp Heart Rate Resp BP SpO2   11/15/22 1441 11/15/22 1441 11/15/22 1441 11/15/22 1444 11/15/22 1441   98.9 °F (37.2 °C) 91 18 154/80 95 %      Temp src Heart Rate Source Patient Position BP Location FiO2 (%)   -- -- -- -- --              Physical Exam   Constitutional: Pt. is oriented to person, place, and time and well-developed, well-nourished, and in no distress.   HENT: Normocephalic and atraumatic.   Neck: Normal range of motion. Neck supple. No JVD present.   Cardiovascular: Normal rate, regular rhythm and normal heart sounds. No murmur heard.  Pulmonary/Chest: Effort normal and breath sounds normal. No stridor. No respiratory distress. No wheezes, no rales.   Abdominal: Soft. Bowel sounds are normal. No distension. There is no tenderness. There is no rebound and no guarding.   Musculoskeletal: Normal range of motion. No edema, tenderness or deformity.   Neurological: Pt. is alert and oriented to person, place, and time.  She has no focal neurologic deficits  Skin: Skin is warm and dry. No rash noted. Pt. is not diaphoretic. No erythema.   Psychiatric: Mood, affect and judgment normal.  She is pleasant and cooperative.  Nursing note and vitals reviewed.        LAB RESULTS  Recent Results (from the past 24 hour(s))   Comprehensive Metabolic Panel    Collection Time: 11/15/22  2:57 PM    Specimen: Arm, Left; Blood   Result Value Ref Range    Glucose 137 (H) 65 - 99 mg/dL    BUN 26 (H) 8 - 23 mg/dL    Creatinine 1.07 (H) 0.57 - 1.00 mg/dL    Sodium 135 (L) 136 - 145  mmol/L    Potassium 4.3 3.5 - 5.2 mmol/L    Chloride 100 98 - 107 mmol/L    CO2 23.8 22.0 - 29.0 mmol/L    Calcium 9.7 8.6 - 10.5 mg/dL    Total Protein 6.9 6.0 - 8.5 g/dL    Albumin 4.20 3.50 - 5.20 g/dL    ALT (SGPT) 47 (H) 1 - 33 U/L    AST (SGOT) 74 (H) 1 - 32 U/L    Alkaline Phosphatase 105 39 - 117 U/L    Total Bilirubin 0.5 0.0 - 1.2 mg/dL    Globulin 2.7 gm/dL    A/G Ratio 1.6 g/dL    BUN/Creatinine Ratio 24.3 7.0 - 25.0    Anion Gap 11.2 5.0 - 15.0 mmol/L    eGFR 52.9 (L) >60.0 mL/min/1.73   Troponin    Collection Time: 11/15/22  2:57 PM    Specimen: Arm, Left; Blood   Result Value Ref Range    Troponin T 0.013 0.000 - 0.030 ng/mL   CBC Auto Differential    Collection Time: 11/15/22  2:57 PM    Specimen: Arm, Left; Blood   Result Value Ref Range    WBC 9.15 3.40 - 10.80 10*3/mm3    RBC 4.02 3.77 - 5.28 10*6/mm3    Hemoglobin 11.6 (L) 12.0 - 15.9 g/dL    Hematocrit 36.3 34.0 - 46.6 %    MCV 90.3 79.0 - 97.0 fL    MCH 28.9 26.6 - 33.0 pg    MCHC 32.0 31.5 - 35.7 g/dL    RDW 13.7 12.3 - 15.4 %    RDW-SD 45.0 37.0 - 54.0 fl    MPV 9.4 6.0 - 12.0 fL    Platelets 266 140 - 450 10*3/mm3    Neutrophil % 72.3 42.7 - 76.0 %    Lymphocyte % 18.1 (L) 19.6 - 45.3 %    Monocyte % 7.0 5.0 - 12.0 %    Eosinophil % 2.0 0.3 - 6.2 %    Basophil % 0.3 0.0 - 1.5 %    Immature Grans % 0.3 0.0 - 0.5 %    Neutrophils, Absolute 6.61 1.70 - 7.00 10*3/mm3    Lymphocytes, Absolute 1.66 0.70 - 3.10 10*3/mm3    Monocytes, Absolute 0.64 0.10 - 0.90 10*3/mm3    Eosinophils, Absolute 0.18 0.00 - 0.40 10*3/mm3    Basophils, Absolute 0.03 0.00 - 0.20 10*3/mm3    Immature Grans, Absolute 0.03 0.00 - 0.05 10*3/mm3    nRBC 0.1 0.0 - 0.2 /100 WBC   Green Top (Gel)    Collection Time: 11/15/22  2:57 PM   Result Value Ref Range    Extra Tube Hold for add-ons.    Lavender Top    Collection Time: 11/15/22  2:57 PM   Result Value Ref Range    Extra Tube hold for add-on    Gold Top - SST    Collection Time: 11/15/22  2:57 PM   Result Value Ref Range     Extra Tube Hold for add-ons.    Light Blue Top    Collection Time: 11/15/22  2:57 PM   Result Value Ref Range    Extra Tube Hold for add-ons.    CK    Collection Time: 11/15/22  2:57 PM    Specimen: Arm, Left; Blood   Result Value Ref Range    Creatine Kinase 913 (H) 20 - 180 U/L   ECG 12 Lead Syncope    Collection Time: 11/15/22  4:32 PM   Result Value Ref Range    QT Interval 409 ms   Troponin    Collection Time: 11/15/22  5:17 PM    Specimen: Blood   Result Value Ref Range    Troponin T 0.011 0.000 - 0.030 ng/mL   Urinalysis With Microscopic If Indicated (No Culture) - Urine, Clean Catch    Collection Time: 11/15/22  5:40 PM    Specimen: Urine, Clean Catch   Result Value Ref Range    Color, UA Yellow Yellow, Straw    Appearance, UA Clear Clear    pH, UA 6.5 5.0 - 8.0    Specific Gravity, UA 1.018 1.005 - 1.030    Glucose, UA Negative Negative    Ketones, UA Trace (A) Negative    Bilirubin, UA Negative Negative    Blood, UA Negative Negative    Protein, UA Negative Negative    Leuk Esterase, UA Negative Negative    Nitrite, UA Negative Negative    Urobilinogen, UA 0.2 E.U./dL 0.2 - 1.0 E.U./dL   Myoglobin Screen, Urine - Urine, Clean Catch    Collection Time: 11/15/22  5:40 PM    Specimen: Urine, Clean Catch   Result Value Ref Range    Myoglobin, Qualitative Negative Negative       Ordered the above labs and independently reviewed the results.        RADIOLOGY  CT Head Without Contrast    Result Date: 11/15/2022  CT HEAD WO CONTRAST-  INDICATIONS: Syncope  TECHNIQUE: Radiation dose reduction techniques were utilized, including automated exposure control and exposure modulation based on body size. Noncontrast head CT  COMPARISON: 08/30/2017  FINDINGS:    No acute intracranial hemorrhage, midline shift or mass effect. No acute territorial infarct is identified.  Mild periventricular hypodensities suggest chronic small vessel ischemic change in a patient this age.  Arterial calcifications are seen at the base of  the brain.  Ventricles, cisterns, cerebral sulci are unremarkable for patient age.  Likely mucous retention cyst in left sphenoid sinus. The visualized paranasal sinuses, orbits, mastoid air cells are otherwise unremarkable.       No acute intracranial hemorrhage or hydrocephalus. If there is further clinical concern, MRI could be considered for further evaluation.  This report was finalized on 11/15/2022 5:08 PM by Dr. Maxi Hardwick M.D.        I ordered the above noted radiological studies. Reviewed by me and discussed with radiologist.  See dictation for official radiology interpretation.      PROCEDURES    Procedures      MEDICATIONS GIVEN IN ER    Medications   nitroglycerin (NITROSTAT) SL tablet 0.4 mg (has no administration in time range)   acetaminophen (TYLENOL) tablet 650 mg (has no administration in time range)   ondansetron (ZOFRAN) tablet 4 mg (has no administration in time range)     Or   ondansetron (ZOFRAN) injection 4 mg (has no administration in time range)   melatonin tablet 3 mg (has no administration in time range)   sodium chloride 0.9 % bolus 1,000 mL (1,000 mL Intravenous New Bag 11/15/22 1576)         PROGRESS, DATA ANALYSIS, CONSULTS, AND MEDICAL DECISION MAKING    Any/all labs have been independently reviewed by me.  Any/all radiology studies have been reviewed by me and discussed with radiologist dictating the report.   EKG's independently viewed and interpreted by me.  Discussion below represents my analysis of pertinent findings related to patient's condition, differential diagnosis, treatment plan and final disposition.    Number of Diagnoses or Management Options     Amount and/or Complexity of Data Reviewed  Clinical lab tests: Yes  Tests in the radiology section of CPT®: Yes  Tests in the medicine section of CPT®: No  Review and summarize past medical records:  (Yes - see HPI)  Independent visualization of images, tracings, or specimens: (Yes - see below)      ED Course as of  11/15/22 1916   Tue Nov 15, 2022   1713 Insert normal range insert normal brain CT of the brain was independently viewed by me and discussed with/interpreted by Dr. Hardwick (radiology)-there are no acute processes identified.  For official interpretation, see dictated report. [WC]   1728 Creatine Kinase(!): 913 [WC]   1728 Patient likely has a component of rhabdomyolysis since she was on the floor for almost 7 hours.  I am concerned regarding her symptoms during this - ?gen tonic/clonic seizures, ? Cardiac etiology, ? Stroke?  She will need to be admitted for further workup and neurology consultation. [WC]   1738 Case discussed with Dr. Escobar ([facility])-she accepts the patient for admission to a telemetry bed. [WC]   1820 Myoglobin, Qualitative: Negative [WC]      ED Course User Index  [WC] Goran Palma MD       AS OF 19:16 EST VITALS:    BP - 136/66  HR - 72  TEMP - 98.9 °F (37.2 °C)  02 SATS - 95%        DIAGNOSIS  Final diagnoses:   Syncope and collapse   Traumatic rhabdomyolysis, initial encounter (HCC)   Seizure-like activity (HCC)         DISPOSITION  Admitted - telemetry          Note Disclaimer: At [facility], we believe that sharing information builds trust and better relationships. You are receiving this note because you recently visited [facility]. It is possible you will see health information before a provider has talked with you about it. This kind of information can be easy to misunderstand. To help you fully understand what it means for your health, we urge you to discuss this note with your provider.\         Goran Palma MD  11/15/22 1916

## 2022-11-16 ENCOUNTER — APPOINTMENT (OUTPATIENT)
Dept: CARDIOLOGY | Facility: HOSPITAL | Age: 77
End: 2022-11-16

## 2022-11-16 ENCOUNTER — APPOINTMENT (OUTPATIENT)
Dept: NEUROLOGY | Facility: HOSPITAL | Age: 77
End: 2022-11-16

## 2022-11-16 ENCOUNTER — APPOINTMENT (OUTPATIENT)
Dept: MRI IMAGING | Facility: HOSPITAL | Age: 77
End: 2022-11-16

## 2022-11-16 PROBLEM — E03.9 HYPOTHYROIDISM (ACQUIRED): Status: ACTIVE | Noted: 2022-11-16

## 2022-11-16 PROBLEM — K21.9 GERD WITHOUT ESOPHAGITIS: Status: ACTIVE | Noted: 2022-11-16

## 2022-11-16 LAB
ANION GAP SERPL CALCULATED.3IONS-SCNC: 11.1 MMOL/L (ref 5–15)
AORTIC DIMENSIONLESS INDEX: 1 (DI)
BH CV ECHO MEAS - ACS: 1.9 CM
BH CV ECHO MEAS - AO MAX PG: 9.8 MMHG
BH CV ECHO MEAS - AO MEAN PG: 4.6 MMHG
BH CV ECHO MEAS - AO V2 MAX: 156.7 CM/SEC
BH CV ECHO MEAS - AO V2 VTI: 35.5 CM
BH CV ECHO MEAS - AVA(I,D): 3.4 CM2
BH CV ECHO MEAS - EDV(CUBED): 79 ML
BH CV ECHO MEAS - EDV(MOD-SP2): 64 ML
BH CV ECHO MEAS - EDV(MOD-SP4): 49 ML
BH CV ECHO MEAS - EF(MOD-BP): 68 %
BH CV ECHO MEAS - EF(MOD-SP2): 71.9 %
BH CV ECHO MEAS - EF(MOD-SP4): 63.3 %
BH CV ECHO MEAS - ESV(CUBED): 22.7 ML
BH CV ECHO MEAS - ESV(MOD-SP2): 18 ML
BH CV ECHO MEAS - ESV(MOD-SP4): 18 ML
BH CV ECHO MEAS - FS: 34.1 %
BH CV ECHO MEAS - IVS/LVPW: 1.07 CM
BH CV ECHO MEAS - IVSD: 1.06 CM
BH CV ECHO MEAS - LAT PEAK E' VEL: 8.4 CM/SEC
BH CV ECHO MEAS - LV DIASTOLIC VOL/BSA (35-75): 26.4 CM2
BH CV ECHO MEAS - LV MASS(C)D: 146 GRAMS
BH CV ECHO MEAS - LV MAX PG: 8.8 MMHG
BH CV ECHO MEAS - LV MEAN PG: 4.8 MMHG
BH CV ECHO MEAS - LV SYSTOLIC VOL/BSA (12-30): 9.7 CM2
BH CV ECHO MEAS - LV V1 MAX: 148.7 CM/SEC
BH CV ECHO MEAS - LV V1 VTI: 34.6 CM
BH CV ECHO MEAS - LVIDD: 4.3 CM
BH CV ECHO MEAS - LVIDS: 2.8 CM
BH CV ECHO MEAS - LVOT AREA: 3.5 CM2
BH CV ECHO MEAS - LVOT DIAM: 2.12 CM
BH CV ECHO MEAS - LVPWD: 0.98 CM
BH CV ECHO MEAS - MED PEAK E' VEL: 5.7 CM/SEC
BH CV ECHO MEAS - MV A DUR: 0.13 SEC
BH CV ECHO MEAS - MV A MAX VEL: 83.9 CM/SEC
BH CV ECHO MEAS - MV DEC SLOPE: 583.5 CM/SEC2
BH CV ECHO MEAS - MV DEC TIME: 0.27 MSEC
BH CV ECHO MEAS - MV E MAX VEL: 75.9 CM/SEC
BH CV ECHO MEAS - MV E/A: 0.91
BH CV ECHO MEAS - MV MAX PG: 4.6 MMHG
BH CV ECHO MEAS - MV MEAN PG: 1.61 MMHG
BH CV ECHO MEAS - MV P1/2T: 53.1 MSEC
BH CV ECHO MEAS - MV V2 VTI: 34.9 CM
BH CV ECHO MEAS - MVA(P1/2T): 4.1 CM2
BH CV ECHO MEAS - MVA(VTI): 3.5 CM2
BH CV ECHO MEAS - PA ACC TIME: 0.19 SEC
BH CV ECHO MEAS - PA PR(ACCEL): -6.6 MMHG
BH CV ECHO MEAS - PA V2 MAX: 93.7 CM/SEC
BH CV ECHO MEAS - QP/QS: 0.6
BH CV ECHO MEAS - RV MAX PG: 2.9 MMHG
BH CV ECHO MEAS - RV V1 MAX: 84.9 CM/SEC
BH CV ECHO MEAS - RV V1 VTI: 20.6 CM
BH CV ECHO MEAS - RVOT DIAM: 2.13 CM
BH CV ECHO MEAS - SI(MOD-SP2): 24.8 ML/M2
BH CV ECHO MEAS - SI(MOD-SP4): 16.7 ML/M2
BH CV ECHO MEAS - SV(LVOT): 121.7 ML
BH CV ECHO MEAS - SV(MOD-SP2): 46 ML
BH CV ECHO MEAS - SV(MOD-SP4): 31 ML
BH CV ECHO MEAS - SV(RVOT): 73.2 ML
BH CV ECHO MEASUREMENTS AVERAGE E/E' RATIO: 10.77
BH CV XLRA - RV BASE: 3.5 CM
BH CV XLRA - RV LENGTH: 6.8 CM
BH CV XLRA - RV MID: 2.9 CM
BH CV XLRA - TDI S': 13.8 CM/SEC
BH CV XLRA MEAS LEFT DIST CCA EDV: 27 CM/SEC
BH CV XLRA MEAS LEFT DIST CCA PSV: 97.9 CM/SEC
BH CV XLRA MEAS LEFT DIST ICA EDV: -25.8 CM/SEC
BH CV XLRA MEAS LEFT DIST ICA PSV: -89.1 CM/SEC
BH CV XLRA MEAS LEFT ICA/CCA RATIO: 1.69
BH CV XLRA MEAS LEFT MID ICA EDV: -52.6 CM/SEC
BH CV XLRA MEAS LEFT MID ICA PSV: -165 CM/SEC
BH CV XLRA MEAS LEFT PROX CCA EDV: 24.4 CM/SEC
BH CV XLRA MEAS LEFT PROX CCA PSV: 127 CM/SEC
BH CV XLRA MEAS LEFT PROX ECA EDV: -19.9 CM/SEC
BH CV XLRA MEAS LEFT PROX ECA PSV: -108 CM/SEC
BH CV XLRA MEAS LEFT PROX ICA EDV: 29.1 CM/SEC
BH CV XLRA MEAS LEFT PROX ICA PSV: 101 CM/SEC
BH CV XLRA MEAS LEFT PROX SCLA PSV: 169 CM/SEC
BH CV XLRA MEAS LEFT VERTEBRAL A EDV: 16.1 CM/SEC
BH CV XLRA MEAS LEFT VERTEBRAL A PSV: 65.6 CM/SEC
BH CV XLRA MEAS RIGHT DIST CCA EDV: 23.5 CM/SEC
BH CV XLRA MEAS RIGHT DIST CCA PSV: 101 CM/SEC
BH CV XLRA MEAS RIGHT DIST ICA EDV: -28.7 CM/SEC
BH CV XLRA MEAS RIGHT DIST ICA PSV: -101 CM/SEC
BH CV XLRA MEAS RIGHT ICA/CCA RATIO: 1.11
BH CV XLRA MEAS RIGHT MID ICA EDV: -28.7 CM/SEC
BH CV XLRA MEAS RIGHT MID ICA PSV: -96.7 CM/SEC
BH CV XLRA MEAS RIGHT PROX CCA EDV: 17.6 CM/SEC
BH CV XLRA MEAS RIGHT PROX CCA PSV: 113 CM/SEC
BH CV XLRA MEAS RIGHT PROX ECA EDV: -18.1 CM/SEC
BH CV XLRA MEAS RIGHT PROX ECA PSV: -136 CM/SEC
BH CV XLRA MEAS RIGHT PROX ICA EDV: 25.1 CM/SEC
BH CV XLRA MEAS RIGHT PROX ICA PSV: 112 CM/SEC
BH CV XLRA MEAS RIGHT PROX SCLA PSV: 147 CM/SEC
BH CV XLRA MEAS RIGHT VERTEBRAL A EDV: 9 CM/SEC
BH CV XLRA MEAS RIGHT VERTEBRAL A PSV: 47.5 CM/SEC
BUN SERPL-MCNC: 18 MG/DL (ref 8–23)
BUN/CREAT SERPL: 23.7 (ref 7–25)
CALCIUM SPEC-SCNC: 8.4 MG/DL (ref 8.6–10.5)
CHLORIDE SERPL-SCNC: 99 MMOL/L (ref 98–107)
CK SERPL-CCNC: 413 U/L (ref 20–180)
CO2 SERPL-SCNC: 25.9 MMOL/L (ref 22–29)
CREAT SERPL-MCNC: 0.76 MG/DL (ref 0.57–1)
DEPRECATED RDW RBC AUTO: 45.1 FL (ref 37–54)
EGFRCR SERPLBLD CKD-EPI 2021: 79.8 ML/MIN/1.73
ERYTHROCYTE [DISTWIDTH] IN BLOOD BY AUTOMATED COUNT: 13.5 % (ref 12.3–15.4)
GLUCOSE SERPL-MCNC: 122 MG/DL (ref 65–99)
HCT VFR BLD AUTO: 31.7 % (ref 34–46.6)
HGB BLD-MCNC: 10 G/DL (ref 12–15.9)
INR PPP: 0.98 (ref 0.9–1.1)
LEFT ARM BP: NORMAL MMHG
LEFT ATRIUM VOLUME INDEX: 26.1 ML/M2
MAGNESIUM SERPL-MCNC: 2.5 MG/DL (ref 1.6–2.4)
MAXIMAL PREDICTED HEART RATE: 141 BPM
MAXIMAL PREDICTED HEART RATE: 141 BPM
MCH RBC QN AUTO: 28.9 PG (ref 26.6–33)
MCHC RBC AUTO-ENTMCNC: 31.5 G/DL (ref 31.5–35.7)
MCV RBC AUTO: 91.6 FL (ref 79–97)
PLATELET # BLD AUTO: 225 10*3/MM3 (ref 140–450)
PMV BLD AUTO: 10.2 FL (ref 6–12)
POTASSIUM SERPL-SCNC: 4.3 MMOL/L (ref 3.5–5.2)
PROTHROMBIN TIME: 13.1 SECONDS (ref 11.7–14.2)
RBC # BLD AUTO: 3.46 10*6/MM3 (ref 3.77–5.28)
RIGHT ARM BP: NORMAL MMHG
SINUS: 2.9 CM
SODIUM SERPL-SCNC: 136 MMOL/L (ref 136–145)
STRESS TARGET HR: 120 BPM
STRESS TARGET HR: 120 BPM
WBC NRBC COR # BLD: 6.2 10*3/MM3 (ref 3.4–10.8)

## 2022-11-16 PROCEDURE — 83735 ASSAY OF MAGNESIUM: CPT | Performed by: NURSE PRACTITIONER

## 2022-11-16 PROCEDURE — 33285 INSJ SUBQ CAR RHYTHM MNTR: CPT

## 2022-11-16 PROCEDURE — 25010000002 DIPHENHYDRAMINE PER 50 MG: Performed by: INTERNAL MEDICINE

## 2022-11-16 PROCEDURE — C1764 EVENT RECORDER, CARDIAC: HCPCS

## 2022-11-16 PROCEDURE — 25010000002 MIDAZOLAM PER 1 MG: Performed by: INTERNAL MEDICINE

## 2022-11-16 PROCEDURE — 99222 1ST HOSP IP/OBS MODERATE 55: CPT | Performed by: PSYCHIATRY & NEUROLOGY

## 2022-11-16 PROCEDURE — 80048 BASIC METABOLIC PNL TOTAL CA: CPT | Performed by: INTERNAL MEDICINE

## 2022-11-16 PROCEDURE — 93306 TTE W/DOPPLER COMPLETE: CPT | Performed by: INTERNAL MEDICINE

## 2022-11-16 PROCEDURE — 95816 EEG AWAKE AND DROWSY: CPT

## 2022-11-16 PROCEDURE — 99152 MOD SED SAME PHYS/QHP 5/>YRS: CPT

## 2022-11-16 PROCEDURE — 70551 MRI BRAIN STEM W/O DYE: CPT

## 2022-11-16 PROCEDURE — 95816 EEG AWAKE AND DROWSY: CPT | Performed by: PSYCHIATRY & NEUROLOGY

## 2022-11-16 PROCEDURE — 93306 TTE W/DOPPLER COMPLETE: CPT

## 2022-11-16 PROCEDURE — 25010000002 FENTANYL CITRATE (PF) 50 MCG/ML SOLUTION: Performed by: INTERNAL MEDICINE

## 2022-11-16 PROCEDURE — 25010000002 PERFLUTREN (DEFINITY) 8.476 MG IN SODIUM CHLORIDE (PF) 0.9 % 10 ML INJECTION: Performed by: INTERNAL MEDICINE

## 2022-11-16 PROCEDURE — 85610 PROTHROMBIN TIME: CPT | Performed by: NURSE PRACTITIONER

## 2022-11-16 PROCEDURE — 93880 EXTRACRANIAL BILAT STUDY: CPT

## 2022-11-16 PROCEDURE — 82550 ASSAY OF CK (CPK): CPT | Performed by: INTERNAL MEDICINE

## 2022-11-16 PROCEDURE — 25010000002 VANCOMYCIN PER 500 MG: Performed by: INTERNAL MEDICINE

## 2022-11-16 PROCEDURE — 85027 COMPLETE CBC AUTOMATED: CPT | Performed by: INTERNAL MEDICINE

## 2022-11-16 PROCEDURE — 0JH602Z INSERTION OF MONITORING DEVICE INTO CHEST SUBCUTANEOUS TISSUE AND FASCIA, OPEN APPROACH: ICD-10-PCS | Performed by: INTERNAL MEDICINE

## 2022-11-16 PROCEDURE — 36415 COLL VENOUS BLD VENIPUNCTURE: CPT | Performed by: INTERNAL MEDICINE

## 2022-11-16 PROCEDURE — 25010000002 ONDANSETRON PER 1 MG: Performed by: INTERNAL MEDICINE

## 2022-11-16 RX ORDER — FENTANYL CITRATE 50 UG/ML
INJECTION, SOLUTION INTRAMUSCULAR; INTRAVENOUS
Status: DISCONTINUED | OUTPATIENT
Start: 2022-11-16 | End: 2022-11-16 | Stop reason: HOSPADM

## 2022-11-16 RX ORDER — ONDANSETRON 2 MG/ML
4 INJECTION INTRAMUSCULAR; INTRAVENOUS EVERY 6 HOURS PRN
Status: DISCONTINUED | OUTPATIENT
Start: 2022-11-16 | End: 2022-11-16 | Stop reason: HOSPADM

## 2022-11-16 RX ORDER — ONDANSETRON 2 MG/ML
INJECTION INTRAMUSCULAR; INTRAVENOUS
Status: DISCONTINUED | OUTPATIENT
Start: 2022-11-16 | End: 2022-11-16 | Stop reason: HOSPADM

## 2022-11-16 RX ORDER — MIDAZOLAM HYDROCHLORIDE 1 MG/ML
INJECTION INTRAMUSCULAR; INTRAVENOUS
Status: DISCONTINUED | OUTPATIENT
Start: 2022-11-16 | End: 2022-11-16 | Stop reason: HOSPADM

## 2022-11-16 RX ORDER — LORAZEPAM 2 MG/ML
1 INJECTION INTRAMUSCULAR EVERY 6 HOURS PRN
Status: DISCONTINUED | OUTPATIENT
Start: 2022-11-16 | End: 2022-11-17

## 2022-11-16 RX ORDER — DIPHENHYDRAMINE HYDROCHLORIDE 50 MG/ML
INJECTION INTRAMUSCULAR; INTRAVENOUS
Status: DISCONTINUED | OUTPATIENT
Start: 2022-11-16 | End: 2022-11-16 | Stop reason: HOSPADM

## 2022-11-16 RX ORDER — VANCOMYCIN HYDROCHLORIDE 1 G/200ML
INJECTION, SOLUTION INTRAVENOUS
Status: COMPLETED | OUTPATIENT
Start: 2022-11-16 | End: 2022-11-16

## 2022-11-16 RX ORDER — SODIUM CHLORIDE 0.9 % (FLUSH) 0.9 %
10 SYRINGE (ML) INJECTION EVERY 12 HOURS SCHEDULED
Status: DISCONTINUED | OUTPATIENT
Start: 2022-11-16 | End: 2022-11-16 | Stop reason: HOSPADM

## 2022-11-16 RX ADMIN — SPIRONOLACTONE 25 MG: 25 TABLET, FILM COATED ORAL at 11:26

## 2022-11-16 RX ADMIN — OXYCODONE HYDROCHLORIDE AND ACETAMINOPHEN 1500 MG: 500 TABLET ORAL at 11:27

## 2022-11-16 RX ADMIN — FAMOTIDINE 40 MG: 20 TABLET ORAL at 20:56

## 2022-11-16 RX ADMIN — PERFLUTREN 2 ML: 6.52 INJECTION, SUSPENSION INTRAVENOUS at 09:10

## 2022-11-16 RX ADMIN — Medication 100 MG: at 11:26

## 2022-11-16 RX ADMIN — Medication 1 CAPSULE: at 11:26

## 2022-11-16 RX ADMIN — Medication 10 ML: at 13:37

## 2022-11-16 RX ADMIN — Medication 1000 UNITS: at 11:27

## 2022-11-16 RX ADMIN — LEVOTHYROXINE SODIUM 88 MCG: 0.09 TABLET ORAL at 05:16

## 2022-11-16 RX ADMIN — CETIRIZINE HYDROCHLORIDE 10 MG: 10 TABLET ORAL at 11:26

## 2022-11-16 RX ADMIN — Medication 500 MCG: at 11:27

## 2022-11-16 NOTE — CONSULTS
Patient Identification:  NAME:  Anabell Damian  Age:  77 y.o.   Sex:  female   :  1945   MRN:  [id number]       Chief complaint: I passed out, reason for consult syncope    History of present illness: This patient was awakened in the morning by bilateral calf cramping and she quickly stood up.  She also had a full bladder and needed to use the restroom and on the way there lost consciousness.  She stayed on the floor but had nausea and vomiting is associated symptoms.  Duration of the syncope not known quality was syncope.  She did bite her lip and part of her cheek.  Location was not pertinent.  Context is a patient who has had syncope before in her life over the last 20 years.  Modifying factors almost certainly ending up on the floor to help her regain blood flow to the brain.   states there was some incontinence but she also had nausea and vomiting and recall she had a full bladder and had to go and that is another reason why she had gotten up.  MRI of the brain by my independent eyeball review shows chronic changes only but no acute abnormality      Past medical history:  Past Medical History:   Diagnosis Date   • Adenomatous polyp of colon 2020   • Family hx of colon cancer    • Hypertension    • Hypothyroidism    • PONV (postoperative nausea and vomiting)        Past surgical history:  Past Surgical History:   Procedure Laterality Date   • BREAST CYST ASPIRATION     • COLONOSCOPY  2006   • COLONOSCOPY N/A 10/18/2017    Procedure: COLONOSCOPY into cecum with cold biopsy polypectomy and hot snare polypectomies;  Surgeon: Rosanne Borden MD;  Location: [facility] ENDOSCOPY;  Service:    • COLONOSCOPY N/A 2020    Procedure: COLONOSCOPY into cecum with cold biopsy polypectomies;  Surgeon: Rosanne Borden MD;  Location: [facility] ENDOSCOPY;  Service: Gastroenterology;  Laterality: N/A;  Pre op: History of Polyps  Post op: Diverticulosis, Polyps   • HEMORRHOIDECTOMY     • OTHER SURGICAL  HISTORY      rectocele repair   • TUBAL ABDOMINAL LIGATION  1982       Allergies:  Cefdinir, Hydrocodone, Levofloxacin, Lisinopril, and Penicillins    Home medications:  Medications Prior to Admission   Medication Sig Dispense Refill Last Dose   • APPLE CIDER VINEGAR PO Take  by mouth.   11/14/2022   • BIOTIN PO Take  by mouth.   11/15/2022   • cetirizine (zyrTEC) 10 MG tablet Take 10 mg by mouth Daily.   11/15/2022   • Cholecalciferol (VITAMIN D3) 75 MCG (3000 UT) tablet Take 1 tablet by mouth Daily.   11/15/2022   • CRANBERRY PO Take 1,680 mg by mouth Daily.   11/15/2022   • cyanocobalamin (VITAMIN B-12) 500 MCG tablet Take 500 mcg by mouth Daily.   11/15/2022   • famotidine (PEPCID) 40 MG tablet Take 40 mg by mouth every night at bedtime.   11/14/2022   • GARLIC PO Take  by mouth.   11/14/2022   • levothyroxine (SYNTHROID, LEVOTHROID) 88 MCG tablet 88 mcg Daily.   11/15/2022   • MAGNESIUM PO Take  by mouth.   11/14/2022   • Omega-3 Fatty Acids (FISH OIL) 1000 MG capsule capsule Take  by mouth Daily.   11/15/2022   • PROBIOTIC PRODUCT PO Take  by mouth.   11/15/2022   • spironolactone (ALDACTONE) 25 MG tablet Take 25 mg by mouth Daily.   Past Week   • vitamin B-6 (PYRIDOXINE) 100 MG tablet Take 100 mg by mouth Daily.   11/15/2022   • vitamin C (ASCORBIC ACID) 500 MG tablet Take 4,000 mg by mouth Daily.   11/15/2022   • fexofenadine (ALLEGRA) 180 MG tablet Take 180 mg by mouth Daily.           Hospital medications:  vitamin C, 1,500 mg, Oral, Daily  cetirizine, 10 mg, Oral, Daily  cholecalciferol, 1,000 Units, Oral, Daily  famotidine, 40 mg, Oral, Nightly  lactobacillus acidophilus, 1 capsule, Oral, Daily  levothyroxine, 88 mcg, Oral, Q AM  magnesium (as) gluconate, 27 mg, Oral, Daily  spironolactone, 25 mg, Oral, Daily  cyanocobalamin, 500 mcg, Oral, Daily  vitamin B-6, 100 mg, Oral, Daily         •  acetaminophen  •  LORazepam  •  melatonin  •  nitroglycerin  •  ondansetron **OR** ondansetron    Family  history:  Family History   Problem Relation Age of Onset   • Colon cancer Paternal Aunt    • Breast cancer Neg Hx        Social history:  Social History     Tobacco Use   • Smoking status: Former     Packs/day: 1.00     Types: Cigarettes     Quit date:      Years since quittin.9   • Smokeless tobacco: Never   Vaping Use   • Vaping Use: Never used   Substance Use Topics   • Alcohol use: Yes     Comment: 4-5 drinks per week   • Drug use: No       Review of systems: No hair eyes ears nose throat skin bone joint  lymphatic hematologic oncologic complaints no neck pain chest pain abdominal pain bowel bladder incontinence fever chills or rash    Objective:  Vitals Ranges:   Temp:  [97.8 °F (36.6 °C)-98.9 °F (37.2 °C)] 97.8 °F (36.6 °C)  Heart Rate:  [58-91] 58  Resp:  [17-18] 18  BP: (122-162)/(59-80) 162/80      Physical Exam:  Patient is awake alert oriented x3 in no distress fund of knowledge good attention span concentration good recent remote memory good language function normal well-developed well-nourished.  Pupils 3 constricting to 2-1/2 unable to visualize disc retinas extraocular movements full without nystagmus nasolabial folds palate tongue symmetrical normal hearing facial sensation head turning shoulder shrug motor 5 out of 5 x 4 extremities no atrophy fasciculations rigidity or bradykinesia reflexes trace throughout symmetrical toes downgoing bilaterally sensation normal light touch face both arms both legs coordination normal in the upper and lower extremities Station and gait was not tested at this time for fear she would stand up and pass out again.  Heart is regular without murmur neck supple without bruits extremities no clubbing cyanosis or edema visual acuity normal at 3 feet    Results review:   I reviewed the patient's new clinical results.    Data review:  Lab Results (last 24 hours)     Procedure Component Value Units Date/Time    CK [[id number]]  (Abnormal) Collected: 22 3012     Specimen: Blood Updated: 11/16/22 0431     Creatine Kinase 413 U/L     Basic Metabolic Panel [[id number]]  (Abnormal) Collected: 11/16/22 0336    Specimen: Blood Updated: 11/16/22 0431     Glucose 122 mg/dL      BUN 18 mg/dL      Creatinine 0.76 mg/dL      Sodium 136 mmol/L      Potassium 4.3 mmol/L      Chloride 99 mmol/L      CO2 25.9 mmol/L      Calcium 8.4 mg/dL      BUN/Creatinine Ratio 23.7     Anion Gap 11.1 mmol/L      eGFR 79.8 mL/min/1.73      Comment: National Kidney Foundation and American Society of Nephrology (ASN) Task Force recommended calculation based on the Chronic Kidney Disease Epidemiology Collaboration (CKD-EPI) equation refit without adjustment for race.       Narrative:      GFR Normal >60  Chronic Kidney Disease <60  Kidney Failure <15    The GFR formula is only valid for adults with stable renal function between ages 18 and 70.    CBC (No Diff) [[id number]]  (Abnormal) Collected: 11/16/22 0336    Specimen: Blood Updated: 11/16/22 0410     WBC 6.20 10*3/mm3      RBC 3.46 10*6/mm3      Hemoglobin 10.0 g/dL      Hematocrit 31.7 %      MCV 91.6 fL      MCH 28.9 pg      MCHC 31.5 g/dL      RDW 13.5 %      RDW-SD 45.1 fl      MPV 10.2 fL      Platelets 225 10*3/mm3     Myoglobin Screen, Urine - Urine, Clean Catch [[id number]]  (Normal) Collected: 11/15/22 1740    Specimen: Urine, Clean Catch Updated: 11/15/22 1819     Myoglobin, Qualitative Negative    Urinalysis With Microscopic If Indicated (No Culture) - Urine, Clean Catch [[id number]]  (Abnormal) Collected: 11/15/22 1740    Specimen: Urine, Clean Catch Updated: 11/15/22 1804     Color, UA Yellow     Appearance, UA Clear     pH, UA 6.5     Specific Gravity, UA 1.018     Glucose, UA Negative     Ketones, UA Trace     Bilirubin, UA Negative     Blood, UA Negative     Protein, UA Negative     Leuk Esterase, UA Negative     Nitrite, UA Negative     Urobilinogen, UA 0.2 E.U./dL    Narrative:      Urine microscopic not indicated.    Troponin  [[id number]]  (Normal) Collected: 11/15/22 1717    Specimen: Blood Updated: 11/15/22 1800     Troponin T 0.011 ng/mL     Narrative:      Troponin T Reference Range:  <= 0.03 ng/mL-   Negative for AMI  >0.03 ng/mL-     Abnormal for myocardial necrosis.  Clinicians would have to utilize clinical acumen, EKG, Troponin and serial changes to determine if it is an Acute Myocardial Infarction or myocardial injury due to an underlying chronic condition.       Results may be falsely decreased if patient taking Biotin.      CK [[id number]]  (Abnormal) Collected: 11/15/22 1457    Specimen: Blood from Arm, Left Updated: 11/15/22 1726     Creatine Kinase 913 U/L     Hardy Draw [[id number]] Collected: 11/15/22 1457    Specimen: Blood from Arm, Left Updated: 11/15/22 1601    Narrative:      The following orders were created for panel order Hardy Draw.  Procedure                               Abnormality         Status                     ---------                               -----------         ------                     Green Top (Gel)[[id number]]                                  Final result               Lavender Top[[id number]]                                     Final result               Gold Top - SST[[id number]]                                   Final result               Light Blue Top[[id number]]                                   Final result                 Please view results for these tests on the individual orders.    Lavender Top [[id number]] Collected: 11/15/22 1457    Specimen: Blood from Arm, Left Updated: 11/15/22 1601     Extra Tube hold for add-on     Comment: Auto resulted       Green Top (Gel) [[id number]] Collected: 11/15/22 1457    Specimen: Blood from Arm, Left Updated: 11/15/22 1601     Extra Tube Hold for add-ons.     Comment: Auto resulted.       Gold Top - SST [[id number]] Collected: 11/15/22 1457    Specimen: Blood from Arm, Left Updated: 11/15/22 1601     Extra Tube Hold for add-ons.     Comment: Auto  resulted.       Light Blue Top [[id number]] Collected: 11/15/22 1457    Specimen: Blood from Arm, Left Updated: 11/15/22 1601     Extra Tube Hold for add-ons.     Comment: Auto resulted       Comprehensive Metabolic Panel [[id number]]  (Abnormal) Collected: 11/15/22 1457    Specimen: Blood from Arm, Left Updated: 11/15/22 1537     Glucose 137 mg/dL      BUN 26 mg/dL      Creatinine 1.07 mg/dL      Sodium 135 mmol/L      Potassium 4.3 mmol/L      Chloride 100 mmol/L      CO2 23.8 mmol/L      Calcium 9.7 mg/dL      Total Protein 6.9 g/dL      Albumin 4.20 g/dL      ALT (SGPT) 47 U/L      AST (SGOT) 74 U/L      Alkaline Phosphatase 105 U/L      Total Bilirubin 0.5 mg/dL      Globulin 2.7 gm/dL      A/G Ratio 1.6 g/dL      BUN/Creatinine Ratio 24.3     Anion Gap 11.2 mmol/L      eGFR 52.9 mL/min/1.73      Comment: National Kidney Foundation and American Society of Nephrology (ASN) Task Force recommended calculation based on the Chronic Kidney Disease Epidemiology Collaboration (CKD-EPI) equation refit without adjustment for race.       Narrative:      GFR Normal >60  Chronic Kidney Disease <60  Kidney Failure <15    The GFR formula is only valid for adults with stable renal function between ages 18 and 70.    Troponin [[id number]]  (Normal) Collected: 11/15/22 1457    Specimen: Blood from Arm, Left Updated: 11/15/22 1537     Troponin T 0.013 ng/mL     Narrative:      Troponin T Reference Range:  <= 0.03 ng/mL-   Negative for AMI  >0.03 ng/mL-     Abnormal for myocardial necrosis.  Clinicians would have to utilize clinical acumen, EKG, Troponin and serial changes to determine if it is an Acute Myocardial Infarction or myocardial injury due to an underlying chronic condition.       Results may be falsely decreased if patient taking Biotin.      CBC & Differential [[id number]]  (Abnormal) Collected: 11/15/22 1457    Specimen: Blood from Arm, Left Updated: 11/15/22 1509    Narrative:      The following orders were created for  panel order CBC & Differential.  Procedure                               Abnormality         Status                     ---------                               -----------         ------                     CBC Auto Differential[[id number]]        Abnormal            Final result                 Please view results for these tests on the individual orders.    CBC Auto Differential [[id number]]  (Abnormal) Collected: 11/15/22 1457    Specimen: Blood from Arm, Left Updated: 11/15/22 1509     WBC 9.15 10*3/mm3      RBC 4.02 10*6/mm3      Hemoglobin 11.6 g/dL      Hematocrit 36.3 %      MCV 90.3 fL      MCH 28.9 pg      MCHC 32.0 g/dL      RDW 13.7 %      RDW-SD 45.0 fl      MPV 9.4 fL      Platelets 266 10*3/mm3      Neutrophil % 72.3 %      Lymphocyte % 18.1 %      Monocyte % 7.0 %      Eosinophil % 2.0 %      Basophil % 0.3 %      Immature Grans % 0.3 %      Neutrophils, Absolute 6.61 10*3/mm3      Lymphocytes, Absolute 1.66 10*3/mm3      Monocytes, Absolute 0.64 10*3/mm3      Eosinophils, Absolute 0.18 10*3/mm3      Basophils, Absolute 0.03 10*3/mm3      Immature Grans, Absolute 0.03 10*3/mm3      nRBC 0.1 /100 WBC            Imaging:  Imaging Results (Last 24 Hours)     Procedure Component Value Units Date/Time    MRI Brain Without Contrast [[id number]] Collected: 11/16/22 0804     Updated: 11/16/22 0808    Narrative:      MRI BRAIN WITHOUT CONTRAST     HISTORY:Dizziness, syncope and collapse.     TECHNIQUE: Brain MRI includes sagittal T1 as well as axial diffusion,  FLAIR, T1, T2, gradient echo sequences.     COMPARISON:CT head without contrast 11/15/2022, 08/30/2017.     FINDINGS:There are no abnormal foci of restricted diffusion to diagnose  an acute infarction. There are patchy foci of deep cerebral white matter  FLAIR white matter hyperintense signal which are nonspecific but are  most likely due to chronic small vessel ischemic white matter change. No  extra-axial fluid collection is observed. There are no  abnormal foci of  gradient echo diminished signal to diagnose hemorrhage or hemosiderin  deposition. The major intracranial flow voids appear within normal  limits. Within the central sphenoid sinus there is a mucous retention  cyst measuring 2.2 x 1 cm and this was also present on the previous CT  08/30/2017.       Impression:      1. No evidence for infarction or acute intracranial abnormality.  2. Mild-to-moderate chronic small vessel ischemic white matter change.  3. Sphenoid sinus mucous retention cyst.     This report was finalized on 11/16/2022 8:05 AM by Dr. Haris Adams M.D.       CT Head Without Contrast [[id number]] Collected: 11/15/22 1706     Updated: 11/15/22 1711    Narrative:      CT HEAD WO CONTRAST-     INDICATIONS: Syncope     TECHNIQUE: Radiation dose reduction techniques were utilized, including  automated exposure control and exposure modulation based on body size.  Noncontrast head CT     COMPARISON: 08/30/2017     FINDINGS:           No acute intracranial hemorrhage, midline shift or mass effect. No acute  territorial infarct is identified.     Mild periventricular hypodensities suggest chronic small vessel ischemic  change in a patient this age.     Arterial calcifications are seen at the base of the brain.     Ventricles, cisterns, cerebral sulci are unremarkable for patient age.     Likely mucous retention cyst in left sphenoid sinus. The visualized  paranasal sinuses, orbits, mastoid air cells are otherwise unremarkable.       Impression:         No acute intracranial hemorrhage or hydrocephalus. If there is further  clinical concern, MRI could be considered for further evaluation.     This report was finalized on 11/15/2022 5:08 PM by Dr. Maxi Hardwick M.D.            PPE worn at all times washed before washed up afterwards disposed of everything properly was not within 6 feet of them for more than few minutes during my exam no aerosols used at any point    Assessment and Plan:    This patient has had syncope after suddenly getting up in the night.  I am confident this was orthostatic hypotension with syncope followed by nausea and vomiting.  I do not believe this patient suffered a seizure but she did bite her tongue and her cheek and I will check an EEG.  I will also check orthostatic blood pressures.  This does not sound like a primary seizure or stroke or TIA syndrome but see what the EEG shows and go from there  Cardiology has seen her and they are planning on using a loop recorder to catch 1 of these events and I am certainly okay with that.  (This is not going to be a primary neurologic etiology)      Wyatt Juárez MD  11/16/22  12:24 EST

## 2022-11-16 NOTE — PROCEDURES
Electrophysiology Procedure Report    Procedure:  1. Loop Recorder implant    Indications:  1. Syncope      History of Present Illness:  78 yo female with recurrent syncope of unclear etiology.    Medications:  Please see nursing notes, I supervised the administration of conscious sedation for approximately, 25 minutes    Description of Procedure:   Informed consent was obtained prior to the procedure. Risks and benefits were fully explained to the patient and family. Despite knowing the risks, the patient and family wanted to proceed with procedure.  The patient presented to the EP/Cardiac Cath Lab in an NPO Post absorptive state. The patient was prepped and draped in a sterile fashion. Antibiotics were given per protocol. The area just lateral to the sternum at the level of the fourth intercostal space was liberally infiltrated with 1% lidocaine with Epinephrine. A small incision was made with the incision cutter. A tract was dilated with a Metzenbaum scissors. The tract was further dilated with the dilator tool. The Loop Recorder was injected into the preformed tract. Then the device was interrogated to ensure adequate sensing. The incision was closed with sterile steri-strips and a sterile dressing. Sponge and needle counts were accurate by the end of the procedure. No acute complications. Blood loss was less than 1 cc.    Device Information:  The device is an Abbott Jot Dx Model [id number], serial number: [id number]    Sensin.2 mV    Assessment:  1. Syncope    Conclusion:  1. s/p successful implantation of a Loop Recorder.  2. Standard postoperative care and teaching.  3. Resume current medications.  4. Follow-up with Dr. Jean Baptiste as scheduled.      Elan Jean Baptiste,   22  16:53 EST

## 2022-11-16 NOTE — CASE MANAGEMENT/SOCIAL WORK
Continued Stay Note  [facility]     Patient Name: Anabell Damian  MRN: [id number]  Today's Date: 11/16/2022    Admit Date: 11/15/2022    Plan: Home with spouse   Discharge Plan     Row Name 11/16/22 1232       Plan    Plan Home with spouse    Patient/Family in Agreement with Plan yes    Plan Comments CCP met with pt and spouse Byron [phone] at bedside; introduced self and role of CCP. Face sheet information and pharmacy verified. Pt lives with spouse in a 2 story patio home. Pt still drives and is [employer]. Pt denies DME at home. Byron provided CCP with pts living will, faxed to HIM. Pt declines meds to beds and denies trouble affording or managing her medications. Pt denies HH or SNF in the past. DC plan is home, spouse to transport. Benito JACK/CCP               Discharge Codes    No documentation.               Expected Discharge Date and Time     Expected Discharge Date Expected Discharge Time    Nov 18, 2022             Diana Cerna RN

## 2022-11-16 NOTE — CONSULTS
Anabell Daiman   77 y.o.  female    LOS: 1 day   Patient Care Team:  Jennifer Lauren MD as PCP - General  Jennifer Lauren MD as PCP - Family Medicine      Subjective  consulted for elevated troponin and syncope    Chief Complaint:    History of Present Illness:   This is a 77 year old female who follows Dr Valente in office.   PMH: HTN, GERD Hypothyroidism, reformed smoker, Vasovagal syndrome.    Patient was told from Primary care doctor to come to ED for elevated troponin level.  She had a syncopal episode early  morning. She states that she had bilateral lower extremity cramps and immediately stood up. When she is got up, she was heading to bathroom when she had a syncopal episode, she urinated on herself and bite her tongue.  She states she felt very nauseated and had vomited. She tells me she laid on the floor for greater part of the days, as every time she would raise her head up she would become nauseous and would vomit.   She was finally able to get up, but felt weak the rest of the day.   She went to her PCP the next day and lab drawn.    She was called later that day and was told to come to ED as her troponin level was elevated.  She denies having chest pain/pressure. Denies arm, neck and jaw pain. Denies SOA, orthopnea.  She states she has had syncopal episode on and off for 20 years and has been diagnosed with vasovagal syndrome.    ED Evaluation:  Trop: 0.013--> 0.011  CK: 913--> 413  EKG:     CT of Head: No acute intracranial hemorrhage or hydrocephalus      Prior Cardiac testin2021: Stress Echo: without any obvious regional wall motion abnormalities, recommended medical therapy- had 6 beat run of vtach.    She had a follow-up Holter monitor which revealed only isolated PACs and PVCs, but no complex ventricular ectopy and this was felt to be favorable.        Past Medical History:   Diagnosis Date   • Adenomatous polyp of colon 2020   • Family hx of colon cancer    • Hypertension    •  Hypothyroidism    • PONV (postoperative nausea and vomiting)      Past Surgical History:   Procedure Laterality Date   • BREAST CYST ASPIRATION     • COLONOSCOPY  06/21/2006   • COLONOSCOPY N/A 10/18/2017    Procedure: COLONOSCOPY into cecum with cold biopsy polypectomy and hot snare polypectomies;  Surgeon: Rosanne Borden MD;  Location:  [facility];  Service:    • COLONOSCOPY N/A 9/22/2020    Procedure: COLONOSCOPY into cecum with cold biopsy polypectomies;  Surgeon: Rosanne Borden MD;  Location: [facility] ENDOSCOPY;  Service: Gastroenterology;  Laterality: N/A;  Pre op: History of Polyps  Post op: Diverticulosis, Polyps   • HEMORRHOIDECTOMY     • OTHER SURGICAL HISTORY      rectocele repair   • TUBAL ABDOMINAL LIGATION  1982     Medications Prior to Admission   Medication Sig Dispense Refill Last Dose   • APPLE CIDER VINEGAR PO Take  by mouth.   11/14/2022   • BIOTIN PO Take  by mouth.   11/15/2022   • cetirizine (zyrTEC) 10 MG tablet Take 10 mg by mouth Daily.   11/15/2022   • Cholecalciferol (VITAMIN D3) 75 MCG (3000 UT) tablet Take 1 tablet by mouth Daily.   11/15/2022   • CRANBERRY PO Take 1,680 mg by mouth Daily.   11/15/2022   • cyanocobalamin (VITAMIN B-12) 500 MCG tablet Take 500 mcg by mouth Daily.   11/15/2022   • famotidine (PEPCID) 40 MG tablet Take 40 mg by mouth every night at bedtime.   11/14/2022   • GARLIC PO Take  by mouth.   11/14/2022   • levothyroxine (SYNTHROID, LEVOTHROID) 88 MCG tablet 88 mcg Daily.   11/15/2022   • MAGNESIUM PO Take  by mouth.   11/14/2022   • Omega-3 Fatty Acids (FISH OIL) 1000 MG capsule capsule Take  by mouth Daily.   11/15/2022   • PROBIOTIC PRODUCT PO Take  by mouth.   11/15/2022   • spironolactone (ALDACTONE) 25 MG tablet Take 25 mg by mouth Daily.   Past Week   • vitamin B-6 (PYRIDOXINE) 100 MG tablet Take 100 mg by mouth Daily.   11/15/2022   • vitamin C (ASCORBIC ACID) 500 MG tablet Take 4,000 mg by mouth Daily.   11/15/2022   • fexofenadine (ALLEGRA) 180  MG tablet Take 180 mg by mouth Daily.          Family History   Problem Relation Age of Onset   • Colon cancer Paternal Aunt    • Breast cancer Neg Hx      Social History     Socioeconomic History   • Marital status:    Tobacco Use   • Smoking status: Former     Packs/day: 1.00     Types: Cigarettes     Quit date:      Years since quittin.9   • Smokeless tobacco: Never   Vaping Use   • Vaping Use: Never used   Substance and Sexual Activity   • Alcohol use: Yes     Comment: 4-5 drinks per week   • Drug use: No   • Sexual activity: Defer     Objective       Review of Systems:   Constitutional: Negative for diaphoresis, fatigue, fever and unexpected weight change.   HENT: Negative.    Eyes: Negative.    Respiratory: Negative for cough, shortness of breath and wheezing.    Cardiovascular: Negative for chest pain, palpitations and leg swelling.   Gastrointestinal: Negative for abdominal pain, blood in stool, constipation, diarrhea, +nausea and +vomiting.   Endocrine: Negative.    Genitourinary: Negative for difficulty urinating, dysuria and frequency.   Musculoskeletal: Negative.    Skin: Negative.    Allergic/Immunologic: Negative for environmental allergies and food allergies.   Neurological: +syncope   Hematological: Negative.    Psychiatric/Behavioral: Negative.        Current Facility-Administered Medications:   •  acetaminophen (TYLENOL) tablet 650 mg, 650 mg, Oral, Q4H PRN, Rosa Escobar MD  •  ascorbic acid (VITAMIN C) tablet 1,500 mg, 1,500 mg, Oral, Daily, Rosa Escobar MD, 1,500 mg at 22 1127  •  cetirizine (zyrTEC) tablet 10 mg, 10 mg, Oral, Daily, Rosa Escobar MD, 10 mg at 22 1126  •  cholecalciferol (VITAMIN D3) tablet 1,000 Units, 1,000 Units, Oral, Daily, Rosa Escobar MD, 1,000 Units at 22 1127  •  famotidine (PEPCID) tablet 40 mg, 40 mg, Oral, Nightly, Rosa Escobar MD, 40 mg at 11/15/22 2147  •  lactobacillus acidophilus  (RISAQUAD) capsule 1 capsule, 1 capsule, Oral, Daily, Rosa Escobar MD, 1 capsule at 11/16/22 1126  •  levothyroxine (SYNTHROID, LEVOTHROID) tablet 88 mcg, 88 mcg, Oral, Q AM, Rosa Escobar MD, 88 mcg at 11/16/22 0516  •  LORazepam (ATIVAN) injection 1 mg, 1 mg, Intravenous, Q6H PRN, Nicho Kapadia MD  •  magnesium (as) gluconate (MAGONATE) tablet 27 mg, 27 mg, Oral, Daily, Rosa Escobar MD  •  melatonin tablet 3 mg, 3 mg, Oral, Nightly PRN, Rosa Escobar MD  •  nitroglycerin (NITROSTAT) SL tablet 0.4 mg, 0.4 mg, Sublingual, Q5 Min PRN, Rosa Escobar MD  •  ondansetron (ZOFRAN) tablet 4 mg, 4 mg, Oral, Q6H PRN **OR** ondansetron (ZOFRAN) injection 4 mg, 4 mg, Intravenous, Q6H PRN, Rosa Escobar MD  •  spironolactone (ALDACTONE) tablet 25 mg, 25 mg, Oral, Daily, Rosa Escobar MD, 25 mg at 11/16/22 1126  •  vitamin B-12 (CYANOCOBALAMIN) tablet 500 mcg, 500 mcg, Oral, Daily, Rosa Escobar MD, 500 mcg at 11/16/22 1127  •  vitamin B-6 (PYRIDOXINE) tablet 100 mg, 100 mg, Oral, Daily, Rosa Escobar MD, 100 mg at 11/16/22 1126      Physical Exam:   Vital Sign Min/Max for last 24 hours  Temp  Min: 97.8 °F (36.6 °C)  Max: 98.9 °F (37.2 °C)   BP  Min: 122/59  Max: 162/80    Pulse  Min: 58  Max: 91     Wt Readings from Last 3 Encounters:   11/16/22 73 kg (160 lb 15 oz)   09/22/20 68 kg (150 lb)   09/02/20 70 kg (154 lb 6.4 oz)       General Appearance:  Awake,  Alert, cooperative, in no acute distress   Head:  Normocephalic, without obvious abnormality, atraumatic   Eyes:          Conjunctivae normal, anicteric, eom intact    Neck: No adenopathy, supple, trachea midline, no thyromegaly, no   carotid bruit, no JVD, no elevated cvp   Lungs:   Clear to auscultation,respirations regular, even and                  unlabored    Heart:  Regular rhythm and normal rate, normal S1 and S2,            No murmur, no gallop, no rub, no click    Chest Wall:   No abnormalities observed   Abdomen:   Normal bowel sounds, no masses, soft nontender, nondistended                    Rectal:   Deferred   Extremities: No edema. Moves all extremities well, no cyanosis, no erythema   Pulses: Pulses palpable and equal bilaterally   Skin: No bleeding, bruising or rash   Neurologic: Speech clear and appropriate, no facial drooping     :       MONITOR: Sr/SB    Results Review:     Sodium Sodium   Date Value Ref Range Status   11/16/2022 136 136 - 145 mmol/L Final   11/15/2022 135 (L) 136 - 145 mmol/L Final      Potassium Potassium   Date Value Ref Range Status   11/16/2022 4.3 3.5 - 5.2 mmol/L Final   11/15/2022 4.3 3.5 - 5.2 mmol/L Final      Chloride Chloride   Date Value Ref Range Status   11/16/2022 99 98 - 107 mmol/L Final   11/15/2022 100 98 - 107 mmol/L Final      Bicarbonate No results found for: PLASMABICARB   BUN BUN   Date Value Ref Range Status   11/16/2022 18 8 - 23 mg/dL Final   11/15/2022 26 (H) 8 - 23 mg/dL Final      Creatinine Creatinine   Date Value Ref Range Status   11/16/2022 0.76 0.57 - 1.00 mg/dL Final   11/15/2022 1.07 (H) 0.57 - 1.00 mg/dL Final      Calcium Calcium   Date Value Ref Range Status   11/16/2022 8.4 (L) 8.6 - 10.5 mg/dL Final   11/15/2022 9.7 8.6 - 10.5 mg/dL Final      Magnesium No results found for: MG     Results from last 7 days   Lab Units 11/16/22  0336   WBC 10*3/mm3 6.20   HEMOGLOBIN g/dL 10.0*   HEMATOCRIT % 31.7*   PLATELETS 10*3/mm3 225     Lab Results   Lab Value Date/Time    TROPONINT 0.011 11/15/2022 1717    TROPONINT 0.013 11/15/2022 1457    TROPONINT <0.010 08/30/2017 0054     No results found for: CHOL  Lab Results   Component Value Date    HDL 61 09/23/2019     Lab Results   Component Value Date     (H) 09/23/2019     Lab Results   Component Value Date    TRIG 77 09/23/2019     No components found for: CHOLHDL  No results found for: PTT  No components found for: PT/INR  Lab Results   Component Value Date    HGBA1C 5.8  (H) 09/23/2019      Lab Results   Component Value Date    TSH 0.393 09/23/2019        Echo EF Estimated  )No results found for: ECHOEFEST      Assessment/ Plan    Active Hospital Problems    Diagnosis  POA   • **Syncope and collapse [R55]  Yes   • Hypothyroidism (acquired) [E03.9]  Yes   • GERD without esophagitis [K21.9]  Yes   • Essential hypertension [I10]  Yes     1. Syncopal episode  2. Elevated CK   - 918  3. HTN  4. HLD  5. ypothyroidism  6. Hx vasovagel syndrome.    Plan:  Recent stress echocardiogram without any obvious regional wall motion abnormalities, recommended medical therapy- had 6 beat run of vtach.  Echocardiogram pending.  Elevated CK most secondary to N/V.- monitor.  Recommend a loop recorder placement- hopefully tomorrow or Friday.             Tereza Boyle, APRN  11/16/22  12:17 EST    Discussed with  Time:   Patient seen and examined heart S1-S2 normal    Lungs clear  Agree with the consult  We will arrange for a loop recorder  She will follow with Dr. Valente.

## 2022-11-16 NOTE — H&P
HISTORY AND PHYSICAL   [facility]        Date of Admission: 11/15/2022  Patient Identification:  Name: Anabell Damian  Age: 77 y.o.  Sex: female  :  1945  MRN: [id number]                     Primary Care Physician: Jennifer Lauren MD    Chief Complaint:  77 year old female who presented to the emergency room after she was called by her pcp to come in; she fell two days ago and had a syncopal episode; she has repeated episodes of nausea and vomiting when she tried to get up so she ended up lying on the floor for several hours; she was finally able to crawl to the bathroom and then could stand up; she had a previously scheduled appointment with her pcp and had labs done; her troponin was elevated; according to her  she had episodes of her arms stiffening and urinary incontinence  History of Present Illness:   As above    Past Medical History:  Past Medical History:   Diagnosis Date   • Adenomatous polyp of colon 2020   • Family hx of colon cancer    • Hypertension    • Hypothyroidism    • PONV (postoperative nausea and vomiting)      Past Surgical History:  Past Surgical History:   Procedure Laterality Date   • BREAST CYST ASPIRATION     • COLONOSCOPY  2006   • COLONOSCOPY N/A 10/18/2017    Procedure: COLONOSCOPY into cecum with cold biopsy polypectomy and hot snare polypectomies;  Surgeon: Rosanne Borden MD;  Location: [facility] ENDOSCOPY;  Service:    • COLONOSCOPY N/A 2020    Procedure: COLONOSCOPY into cecum with cold biopsy polypectomies;  Surgeon: Rosanne Borden MD;  Location: [facility] ENDOSCOPY;  Service: Gastroenterology;  Laterality: N/A;  Pre op: History of Polyps  Post op: Diverticulosis, Polyps   • HEMORRHOIDECTOMY     • OTHER SURGICAL HISTORY      rectocele repair   • TUBAL ABDOMINAL LIGATION        Home Meds:  Medications Prior to Admission   Medication Sig Dispense Refill Last Dose   • APPLE CIDER VINEGAR PO Take  by mouth.   2022   • BIOTIN PO Take   by mouth.   11/15/2022   • cetirizine (zyrTEC) 10 MG tablet Take 10 mg by mouth Daily.   11/15/2022   • Cholecalciferol (VITAMIN D3) 75 MCG (3000 UT) tablet Take 1 tablet by mouth Daily.   11/15/2022   • CRANBERRY PO Take 1,680 mg by mouth Daily.   11/15/2022   • cyanocobalamin (VITAMIN B-12) 500 MCG tablet Take 500 mcg by mouth Daily.   11/15/2022   • famotidine (PEPCID) 40 MG tablet Take 40 mg by mouth every night at bedtime.   2022   • GARLIC PO Take  by mouth.   2022   • levothyroxine (SYNTHROID, LEVOTHROID) 88 MCG tablet 88 mcg Daily.   11/15/2022   • MAGNESIUM PO Take  by mouth.   2022   • Omega-3 Fatty Acids (FISH OIL) 1000 MG capsule capsule Take  by mouth Daily.   11/15/2022   • PROBIOTIC PRODUCT PO Take  by mouth.   11/15/2022   • spironolactone (ALDACTONE) 25 MG tablet Take 25 mg by mouth Daily.   Past Week   • vitamin B-6 (PYRIDOXINE) 100 MG tablet Take 100 mg by mouth Daily.   11/15/2022   • vitamin C (ASCORBIC ACID) 500 MG tablet Take 4,000 mg by mouth Daily.   11/15/2022   • fexofenadine (ALLEGRA) 180 MG tablet Take 180 mg by mouth Daily.          Allergies:  Allergies   Allergen Reactions   • Cefdinir Hives   • Hydrocodone    • Levofloxacin      Tendon pain in left shoulder    • Lisinopril      cough   • Penicillins      Immunizations:    There is no immunization history on file for this patient.  Social History:   Social History     Social History Narrative   • Not on file     Social History     Socioeconomic History   • Marital status:    Tobacco Use   • Smoking status: Former     Packs/day: 1.00     Types: Cigarettes     Quit date:      Years since quittin.8   • Smokeless tobacco: Never   Vaping Use   • Vaping Use: Never used   Substance and Sexual Activity   • Alcohol use: Yes     Comment: 4-5 drinks per week   • Drug use: No   • Sexual activity: Defer       Family History:  Family History   Problem Relation Age of Onset   • Colon cancer Paternal Aunt    •  "Breast cancer Neg Hx         Review of Systems  See history of present illness and past medical history.  Patient denies headache, dizziness,   trauma, change in vision, change in hearing, change in taste, changes in weight, changes in appetite, focal weakness, numbness, or paresthesia.  Patient denies chest pain, palpitations, dyspnea, orthopnea, PND, cough, sinus pressure, rhinorrhea, epistaxis, hemoptysis,  hematemesis, diarrhea, constipation or hematchezia.  Denies cold or heat intolerance, polydipsia, polyuria, polyphagia. Denies hematuria, pyuria, dysuria, hesitancy, frequency or urgency. Denies consumption of raw and under cooked meats foods or change in water source.  Denies fever, chills, sweats, night sweats.  Denies missing any routine medications. Remainder of ROS is negative.    Objective:  T Max 24 hrs: Temp (24hrs), Av.4 °F (36.9 °C), Min:97.9 °F (36.6 °C), Max:98.9 °F (37.2 °C)    Vitals Ranges:   Temp:  [97.9 °F (36.6 °C)-98.9 °F (37.2 °C)] 97.9 °F (36.6 °C)  Heart Rate:  [67-91] 67  Resp:  [17-18] 17  BP: (136-154)/(66-80) 138/76      Exam:  /76 (BP Location: Left arm, Patient Position: Lying)   Pulse 67   Temp 97.9 °F (36.6 °C) (Oral)   Resp 17   Ht 172.7 cm (68\")   Wt 67.1 kg (148 lb)   SpO2 96%   BMI 22.50 kg/m²     General Appearance:    Alert, cooperative, no distress, appears stated age   Head:    Normocephalic, without obvious abnormality, atraumatic   Eyes:    PERRL, conjunctivae/corneas clear, EOM's intact, both eyes   Ears:    Normal external ear canals, both ears   Nose:   Nares normal, septum midline, mucosa normal, no drainage    or sinus tenderness   Throat:   Lips, mucosa, and tongue normal   Neck:   Supple, symmetrical, trachea midline, no adenopathy;     thyroid:  no enlargement/tenderness/nodules; no carotid    bruit or JVD   Back:     Symmetric, no curvature, ROM normal, no CVA tenderness   Lungs:     Decreased breath sounds bilaterally, respirations unlabored "   Chest Wall:    No tenderness or deformity    Heart:    Regular rate and rhythm, S1 and S2 normal, no murmur, rub   or gallop   Abdomen:     Soft, nontender, bowel sounds active all four quadrants,     no masses, no hepatomegaly, no splenomegaly   Extremities:   Extremities normal, atraumatic, no cyanosis or edema   Pulses:   2+ and symmetric all extremities   Skin:   Skin color, texture, turgor normal, no rashes or lesions   Lymph nodes:   Cervical, supraclavicular, and axillary nodes normal   Neurologic:   CNII-XII intact, normal strength, sensation intact throughout      .    Data Review:  Labs in chart were reviewed.  WBC   Date Value Ref Range Status   11/15/2022 9.15 3.40 - 10.80 10*3/mm3 Final     Hemoglobin   Date Value Ref Range Status   11/15/2022 11.6 (L) 12.0 - 15.9 g/dL Final     Hematocrit   Date Value Ref Range Status   11/15/2022 36.3 34.0 - 46.6 % Final     Platelets   Date Value Ref Range Status   11/15/2022 266 140 - 450 10*3/mm3 Final     Sodium   Date Value Ref Range Status   11/15/2022 135 (L) 136 - 145 mmol/L Final     Potassium   Date Value Ref Range Status   11/15/2022 4.3 3.5 - 5.2 mmol/L Final     Chloride   Date Value Ref Range Status   11/15/2022 100 98 - 107 mmol/L Final     CO2   Date Value Ref Range Status   11/15/2022 23.8 22.0 - 29.0 mmol/L Final     BUN   Date Value Ref Range Status   11/15/2022 26 (H) 8 - 23 mg/dL Final     Creatinine   Date Value Ref Range Status   11/15/2022 1.07 (H) 0.57 - 1.00 mg/dL Final     Glucose   Date Value Ref Range Status   11/15/2022 137 (H) 65 - 99 mg/dL Final     Calcium   Date Value Ref Range Status   11/15/2022 9.7 8.6 - 10.5 mg/dL Final     AST (SGOT)   Date Value Ref Range Status   11/15/2022 74 (H) 1 - 32 U/L Final     ALT (SGPT)   Date Value Ref Range Status   11/15/2022 47 (H) 1 - 33 U/L Final     Alkaline Phosphatase   Date Value Ref Range Status   11/15/2022 105 39 - 117 U/L Final                Imaging Results (All)     Procedure  Component Value Units Date/Time    CT Head Without Contrast [[id number]] Collected: 11/15/22 1706     Updated: 11/15/22 1711    Narrative:      CT HEAD WO CONTRAST-     INDICATIONS: Syncope     TECHNIQUE: Radiation dose reduction techniques were utilized, including  automated exposure control and exposure modulation based on body size.  Noncontrast head CT     COMPARISON: 08/30/2017     FINDINGS:           No acute intracranial hemorrhage, midline shift or mass effect. No acute  territorial infarct is identified.     Mild periventricular hypodensities suggest chronic small vessel ischemic  change in a patient this age.     Arterial calcifications are seen at the base of the brain.     Ventricles, cisterns, cerebral sulci are unremarkable for patient age.     Likely mucous retention cyst in left sphenoid sinus. The visualized  paranasal sinuses, orbits, mastoid air cells are otherwise unremarkable.       Impression:         No acute intracranial hemorrhage or hydrocephalus. If there is further  clinical concern, MRI could be considered for further evaluation.     This report was finalized on 11/15/2022 5:08 PM by Dr. Maxi Hardwick M.D.               Assessment:  Active Hospital Problems    Diagnosis  POA   • **Syncope and collapse [R55]  Yes      Resolved Hospital Problems   No resolved problems to display.   nausea and vomiting  Rhabdomyolysis  Hypertension  Hypothyroidism  Anemia  Hyperglycemia      Plan:  Monitor on telemetry  Ask neuro to see her  Check echo  Troponin was normal in the ED  Check mri, carotid doppler  Seizure precautions]  Iv fluids  D.w patient, family and ED provider  Ed notes reviewed  Rosa Escobar MD  11/15/2022  21:16 EST

## 2022-11-17 VITALS
OXYGEN SATURATION: 95 % | BODY MASS INDEX: 23.04 KG/M2 | TEMPERATURE: 97.8 F | SYSTOLIC BLOOD PRESSURE: 104 MMHG | HEIGHT: 68 IN | DIASTOLIC BLOOD PRESSURE: 54 MMHG | WEIGHT: 152.01 LBS | RESPIRATION RATE: 18 BRPM | HEART RATE: 67 BPM

## 2022-11-17 LAB
ALBUMIN SERPL-MCNC: 3.4 G/DL (ref 3.5–5.2)
ALBUMIN/GLOB SERPL: 1.6 G/DL
ALP SERPL-CCNC: 86 U/L (ref 39–117)
ALT SERPL W P-5'-P-CCNC: 31 U/L (ref 1–33)
ANION GAP SERPL CALCULATED.3IONS-SCNC: 12 MMOL/L (ref 5–15)
AST SERPL-CCNC: 30 U/L (ref 1–32)
BILIRUB SERPL-MCNC: 0.4 MG/DL (ref 0–1.2)
BUN SERPL-MCNC: 15 MG/DL (ref 8–23)
BUN/CREAT SERPL: 18.5 (ref 7–25)
CALCIUM SPEC-SCNC: 8.8 MG/DL (ref 8.6–10.5)
CHLORIDE SERPL-SCNC: 103 MMOL/L (ref 98–107)
CK SERPL-CCNC: 147 U/L (ref 20–180)
CO2 SERPL-SCNC: 23 MMOL/L (ref 22–29)
CREAT SERPL-MCNC: 0.81 MG/DL (ref 0.57–1)
DEPRECATED RDW RBC AUTO: 45.1 FL (ref 37–54)
EGFRCR SERPLBLD CKD-EPI 2021: 73.9 ML/MIN/1.73
ERYTHROCYTE [DISTWIDTH] IN BLOOD BY AUTOMATED COUNT: 13.5 % (ref 12.3–15.4)
GLOBULIN UR ELPH-MCNC: 2.1 GM/DL
GLUCOSE SERPL-MCNC: 90 MG/DL (ref 65–99)
HCT VFR BLD AUTO: 32.4 % (ref 34–46.6)
HGB BLD-MCNC: 10.5 G/DL (ref 12–15.9)
MCH RBC QN AUTO: 29.5 PG (ref 26.6–33)
MCHC RBC AUTO-ENTMCNC: 32.4 G/DL (ref 31.5–35.7)
MCV RBC AUTO: 91 FL (ref 79–97)
PLATELET # BLD AUTO: 209 10*3/MM3 (ref 140–450)
PMV BLD AUTO: 10.2 FL (ref 6–12)
POTASSIUM SERPL-SCNC: 4.4 MMOL/L (ref 3.5–5.2)
PROT SERPL-MCNC: 5.5 G/DL (ref 6–8.5)
RBC # BLD AUTO: 3.56 10*6/MM3 (ref 3.77–5.28)
SODIUM SERPL-SCNC: 138 MMOL/L (ref 136–145)
WBC NRBC COR # BLD: 5.43 10*3/MM3 (ref 3.4–10.8)

## 2022-11-17 PROCEDURE — 82550 ASSAY OF CK (CPK): CPT | Performed by: INTERNAL MEDICINE

## 2022-11-17 PROCEDURE — 80053 COMPREHEN METABOLIC PANEL: CPT | Performed by: INTERNAL MEDICINE

## 2022-11-17 PROCEDURE — 85027 COMPLETE CBC AUTOMATED: CPT | Performed by: INTERNAL MEDICINE

## 2022-11-17 RX ADMIN — LEVOTHYROXINE SODIUM 88 MCG: 0.09 TABLET ORAL at 11:14

## 2022-11-17 NOTE — PROGRESS NOTES
Anabell Damian   77 y.o.  female    LOS: 2 days   Patient Care Team:  Jennifer Lauren MD as PCP - General  Jennifer Lauren MD as PCP - Family Medicine      Subjective  sitting on side of bed, eating breakfast    Interval History:     Patient Complaints: No new complaints    Review of Systems:   No cp/soa or syncope    Medication Review:   Current Facility-Administered Medications:   •  acetaminophen (TYLENOL) tablet 650 mg, 650 mg, Oral, Q4H PRN, [place], Tereza L, APRN  •  ascorbic acid (VITAMIN C) tablet 1,500 mg, 1,500 mg, Oral, Daily, Montana, Tereza L, APRN, 1,500 mg at 11/16/22 1127  •  cetirizine (zyrTEC) tablet 10 mg, 10 mg, Oral, Daily, MontanaFaye lobatoica L, APRN, 10 mg at 11/16/22 1126  •  cholecalciferol (VITAMIN D3) tablet 1,000 Units, 1,000 Units, Oral, Daily, MontanaTereza lobato, APRN, 1,000 Units at 11/16/22 1127  •  lactobacillus acidophilus (RISAQUAD) capsule 1 capsule, 1 capsule, Oral, Daily, MontanaFaye lobatoica L, APRN, 1 capsule at 11/16/22 1126  •  levothyroxine (SYNTHROID, LEVOTHROID) tablet 88 mcg, 88 mcg, Oral, Q AM, Montana, Tereza L, APRN, 88 mcg at 11/16/22 0516  •  magnesium (as) gluconate (MAGONATE) tablet 27 mg, 27 mg, Oral, Daily, Montana, Tereza L, APRN  •  spironolactone (ALDACTONE) tablet 25 mg, 25 mg, Oral, Daily, Montana, Tereza L, APRN, 25 mg at 11/16/22 1126  •  vitamin B-12 (CYANOCOBALAMIN) tablet 500 mcg, 500 mcg, Oral, Daily, MontanaFaye lobatoica L, APRN, 500 mcg at 11/16/22 1127  •  vitamin B-6 (PYRIDOXINE) tablet 100 mg, 100 mg, Oral, Daily, Montana, Tereza L, APRN, 100 mg at 11/16/22 1126      Objective   Vital Sign Min/Max for last 24 hours  Temp  Min: 97.8 °F (36.6 °C)  Max: 98.5 °F (36.9 °C)   BP  Min: 104/54  Max: 167/86    Pulse  Min: 58  Max: 80     Wt Readings from Last 3 Encounters:   11/17/22 69 kg (152 lb 0.2 oz)   09/22/20 68 kg (150 lb)   09/02/20 70 kg (154 lb 6.4 oz)        Intake/Output Summary (Last 24 hours) at 11/17/2022 0927  Last data filed at 11/17/2022  0002  Gross per 24 hour   Intake 120 ml   Output --   Net 120 ml     Physical Exam:      General Appearance:    Well developed and well nourished in no acute distress   Head:    Normocephalic, atraumatic   Eyes:            Conjunctivae normal, anicteric, no xanthelasma   Neck:   supple, trachea midline, no thyromegaly, no carotid bruit, no JVD, no elevated CVP   Lungs:     Clear to auscultation,respirations regular, even and                  unlabored    Heart:    Regular rhythm and normal rate, normal S1 and S2,            No murmur, no gallop, no rub, no click   Chest Wall:    No abnormalities observed   Abdomen:     Normal bowel sounds, no masses, no organomegaly, soft        nontender, nondistended, no guarding, no rebound                tenderness   Rectal:     Deferred   Extremities:   No edema. Moves all extremities well, no cyanosis, no erythema   Pulses:   Pulses palpable and equal bilaterally   Skin:   No bleeding, bruising or rash substernal incision CDI   Neurologic:   awake alert and oriented x3, speech clear and approp, no facial drooping     :    Monitor:  SR/SB    Results Review:         Sodium Sodium   Date Value Ref Range Status   11/17/2022 138 136 - 145 mmol/L Final   11/16/2022 136 136 - 145 mmol/L Final   11/15/2022 135 (L) 136 - 145 mmol/L Final      Potassium Potassium   Date Value Ref Range Status   11/17/2022 4.4 3.5 - 5.2 mmol/L Final   11/16/2022 4.3 3.5 - 5.2 mmol/L Final   11/15/2022 4.3 3.5 - 5.2 mmol/L Final      Chloride Chloride   Date Value Ref Range Status   11/17/2022 103 98 - 107 mmol/L Final   11/16/2022 99 98 - 107 mmol/L Final   11/15/2022 100 98 - 107 mmol/L Final      Bicarbonate No results found for: PLASMABICARB   BUN BUN   Date Value Ref Range Status   11/17/2022 15 8 - 23 mg/dL Final   11/16/2022 18 8 - 23 mg/dL Final   11/15/2022 26 (H) 8 - 23 mg/dL Final      Creatinine Creatinine   Date Value Ref Range Status   11/17/2022 0.81 0.57 - 1.00 mg/dL Final   11/16/2022  0.76 0.57 - 1.00 mg/dL Final   11/15/2022 1.07 (H) 0.57 - 1.00 mg/dL Final      Calcium Calcium   Date Value Ref Range Status   11/17/2022 8.8 8.6 - 10.5 mg/dL Final   11/16/2022 8.4 (L) 8.6 - 10.5 mg/dL Final   11/15/2022 9.7 8.6 - 10.5 mg/dL Final      Magnesium Magnesium   Date Value Ref Range Status   11/16/2022 2.5 (H) 1.6 - 2.4 mg/dL Final        Results from last 7 days   Lab Units 11/17/22  0405   WBC 10*3/mm3 5.43   HEMOGLOBIN g/dL 10.5*   HEMATOCRIT % 32.4*   PLATELETS 10*3/mm3 209     Lab Results   Lab Value Date/Time    TROPONINT 0.011 11/15/2022 1717    TROPONINT 0.013 11/15/2022 1457    TROPONINT <0.010 08/30/2017 0054            Echo EF Estimated  No results found for: ECHOEFEST      Assessment/ Plan  Assessment & Plan   Active Hospital Problems    Diagnosis  POA   • **Syncope and collapse [R55]  Yes   • Hypothyroidism (acquired) [E03.9]  Yes   • GERD without esophagitis [K21.9]  Yes   • Essential hypertension [I10]  Yes     1. Syncopal episode  2. Elevated CK   - 918  3. HTN  4. HLD  5. Hypothyroidism  6. Hx vasovagel syndrome.    Plan:  No further syncope.  S/p loop recorder placed yesterday- discharge instruction/incision care given.  Patient to follow up with Dr. Jean Baptiste in 7-10 days for wound and device check.  Appointment will be made.  She is to follow up with Dr. Valente in 2-4 weeks to discuss other cardiac workup.  She is stable from cardiac standpoint for NM home.        Tereza Boyle, APRN  11/17/22  09:27 EST    Patient seen and examined  Heart S1 and S2 normal lungs clear  Neurology feels this is most possibly orthostatic hypotension  Okay to discharge

## 2022-11-17 NOTE — CASE MANAGEMENT/SOCIAL WORK
Case Management Discharge Note      Final Note: Home via family, no additional CCP needs. Benito RN/CCP         Selected Continued Care - Admitted Since 11/15/2022     Destination    No services have been selected for the patient.              Durable Medical Equipment    No services have been selected for the patient.              Dialysis/Infusion    No services have been selected for the patient.              Home Medical Care    No services have been selected for the patient.              Therapy    No services have been selected for the patient.              Community Resources    No services have been selected for the patient.              Community & DME    No services have been selected for the patient.                       Final Discharge Disposition Code: 01 - home or self-care

## 2022-11-17 NOTE — DISCHARGE SUMMARY
Date of Admission: 11/15/2022  Date of Discharge:  11/17/2022  Primary Care Physician: Jennifer Lauren MD     Discharge Diagnosis:  Active Hospital Problems    Diagnosis  POA   • **Syncope and collapse [R55]  Yes   • Hypothyroidism (acquired) [E03.9]  Yes   • GERD without esophagitis [K21.9]  Yes   • Essential hypertension [I10]  Yes      Resolved Hospital Problems   No resolved problems to display.       Presenting Problem/History of Present Illness:  Syncope and collapse [R55]  Seizure-like activity (HCC) [R56.9]  Traumatic rhabdomyolysis, initial encounter (HCC) [T79.6XXA]     77 year old female who presented to the emergency room after she was called by her pcp to come in; she fell two days ago and had a syncopal episode; she has repeated episodes of nausea and vomiting when she tried to get up so she ended up lying on the floor for several hours; she was finally able to crawl to the bathroom and then could stand up; she had a previously scheduled appointment with her pcp and had labs done; her troponin was elevated; according to her  she had episodes of her arms stiffening and urinary incontinence    Hospital Course:  The patient is a 77 y.o. female who presented with syncope.  She was admitted and evaluated by cardiology and neurology services.  Syncope felt to be related to orthostatic hypotension followed by nausea and vomiting.  She was on the floor for a period of time and had mild rhabdomyolysis without renal dysfunction on admission.  She had an elevated troponin in the outpatient setting but was negative x2 here.  She had a loop recorder placed and is going to follow-up with cardiology as an outpatient.  Her symptoms are resolved so if okay with neurology then she can discharge home    Exam Today:  Constitutional:       General: She is not in acute distress.     Appearance: She is not diaphoretic.   HENT:      Head: Normocephalic and atraumatic.   Eyes:      General:         Right eye: No  discharge.         Left eye: No discharge.      Conjunctiva/sclera: Conjunctivae normal.      Pupils: Pupils are equal, round, and reactive to light.   Cardiovascular:      Rate and Rhythm: Normal rate and regular rhythm.      Pulses: Normal pulses.   Pulmonary:      Effort: Pulmonary effort is normal.      Breath sounds: No wheezing or rhonchi.   Abdominal:      General: There is no distension.      Palpations: Abdomen is soft.      Tenderness: There is no abdominal tenderness. There is no guarding or rebound.   Musculoskeletal:         General: No tenderness.      Cervical back: Neck supple. No tenderness.      Right lower leg: No edema.      Left lower leg: No edema.   Skin:     General: Skin is warm and dry.   Neurological:      Mental Status: She is alert and oriented to person, place, and time.   Psychiatric:         Mood and Affect: Mood normal.         Behavior: Behavior normal.     Results:  EEG  Clinical Interpretation:  This routine EEG recording is abnormal due to the presence of intermittent focal left greater than right bitemporal slowing.  The results of study may indicate bitemporal dysfunction.  No potentially epileptic activity or seizure activity is present.  Careful clinical and radiographic correlation is advised.    CT Head  No acute intracranial hemorrhage or hydrocephalus. If there is further  clinical concern, MRI could be considered for further evaluation.    MRI Brain  1. No evidence for infarction or acute intracranial abnormality.  2. Mild-to-moderate chronic small vessel ischemic white matter change.  3. Sphenoid sinus mucous retention cyst.    TTE  •  Left ventricular ejection fraction appears to be 66 - 70%.  •  Left ventricular diastolic function was normal.  •  Peak velocity of the flow distal to the aortic valve is 156.7 cm/s. Aortic valve maximum pressure gradient is 10 mmHg. Aortic valve mean pressure gradient is 5 mmHg. Aortic valve dimensionless index is 1 .    Carotid  Duplex  •  Proximal right internal carotid artery is normal.  •  Proximal left internal carotid artery mild stenosis.  I believe mid ICA acceleration is related to tortuosity.      Procedures Performed:  Procedure(s):  LOOP INSERTION  [facility]  11/16 1652 Note By: Elan Jean Baptiste DO    Consults:   Consults     Date and Time Order Name Status Description    11/16/2022 10:46 AM Inpatient Cardiology Consult Completed     11/15/2022  9:18 PM Inpatient Neurology Consult General Completed     11/15/2022  5:31 PM [facility] (on-call MD unless specified) Details             Discharge Disposition:  Home or Self Care    Discharge Medications:     Discharge Medications      Continue These Medications      Instructions Start Date   APPLE CIDER VINEGAR PO   Oral      BIOTIN PO   Oral      cetirizine 10 MG tablet  Commonly known as: zyrTEC   10 mg, Oral, Daily      CRANBERRY PO   1,680 mg, Oral, Daily      cyanocobalamin 500 MCG tablet  Commonly known as: VITAMIN B-12   500 mcg, Oral, Daily      famotidine 40 MG tablet  Commonly known as: PEPCID   40 mg, Oral, Every Night at Bedtime      fish oil 1000 MG capsule capsule   Oral, Daily      GARLIC PO   Oral      levothyroxine 88 MCG tablet  Commonly known as: SYNTHROID, LEVOTHROID   88 mcg, Daily      MAGNESIUM PO   Oral      PROBIOTIC PRODUCT PO   Oral      spironolactone 25 MG tablet  Commonly known as: ALDACTONE   25 mg, Oral, Daily      vitamin B-6 100 MG tablet  Commonly known as: PYRIDOXINE   100 mg, Oral, Daily      vitamin C 500 MG tablet  Commonly known as: ASCORBIC ACID   4,000 mg, Oral, Daily      Vitamin D3 75 MCG (3000 UT) tablet   1 tablet, Oral, Daily         Stop These Medications    fexofenadine 180 MG tablet  Commonly known as: ALLEGRA            Discharge Diet:   Diet Instructions     Advance Diet As Tolerated            Activity at Discharge:   Activity Instructions     Activity as Tolerated            Follow-up Appointments:   Follow-up Information     Markel  Elan FALCON DO .    Specialties: Cardiac Electrophysiology, Cardiology           Jennifer Lauren MD .    Specialty: Internal Medicine  Contact information:  [address]  [address]  [place] [id number] [phone]             Robin Valente MD Follow up.    Specialty: Cardiology  Contact information:  [address]  [address]  [place] [address]  [phone]                         Test Results Pending at Discharge:       Nicho Kapadia MD  11/17/22  11:59 EST    Time Spent on Discharge Activities: >30 minutes    Dictated portions using Dragon dictation software.  During the entire encounter, I was wearing recommended PPE including face mask and eye protection. Hand sanitization was performed prior to entering room and upon exit.

## 2022-12-01 ENCOUNTER — PREP FOR SURGERY (OUTPATIENT)
Dept: OTHER | Facility: HOSPITAL | Age: 77
End: 2022-12-01

## 2022-12-01 DIAGNOSIS — Z86.010 HISTORY OF COLON POLYPS: Primary | ICD-10-CM

## 2022-12-01 DIAGNOSIS — Z80.0 FH: COLON CANCER: ICD-10-CM

## 2023-01-09 NOTE — TELEPHONE ENCOUNTER
DELETE AFTER REVIEWING: Telephone encounter to be sent to the clinical pool     Caller: Anabell Damian    Relationship to patient: Self    Best call back number: [phone]    Type of visit: COLONOSCOPY     Additional notes: HAS BEEN TRYING TO SCHEDULE SINCE October OF 2022. TALKED TO FRANK AND HAD ALREADY STARTED ENCOUNTER. JUST WANTED TO REITERATE WHY PATIENT WAS CALLING.

## 2023-03-22 ENCOUNTER — TELEPHONE (OUTPATIENT)
Dept: GASTROENTEROLOGY | Facility: CLINIC | Age: 78
End: 2023-03-22
Payer: MEDICARE

## 2023-03-22 PROBLEM — Z86.0100 HISTORY OF COLON POLYPS: Status: ACTIVE | Noted: 2023-03-22

## 2023-03-22 PROBLEM — Z86.010 HISTORY OF COLON POLYPS: Status: ACTIVE | Noted: 2023-03-22

## 2023-03-22 NOTE — TELEPHONE ENCOUNTER
ALICIA Muñoz for colonoscopy on 9/12/23 arrive at 7am. Mailed Prep instructions to Mailing address on-file. ----miralax

## 2023-05-27 NOTE — H&P
[facility]  Pre Procedure History & Physical    Chief Complaint:   personal history of polyps    Subjective     HPI:   Anabell Damian is a 71 y.o. female who presents with To discuss surveillance colonoscopy due to a personal history of polyps.  She's had significant issues in the past of postop nausea and vomiting.  She had a colonoscopy in 2006 and after the procedure 1 she had gone home she had multiple syncopal episodes.  She ended up having to go back to the emergency room and had significant nausea vomiting and electrolyte abnormalities.  She did better with her last surgery which was a rectocele repair.  She did have preoperative medication for postoperative nausea and vomiting.  Her last colonoscopy was performed by Dr. Jennifer Jha in 2012.  She thinks that she had a polyp removed.  She is always been a little on the constipated side she manages this by eating prunes daily.  She has a bowel movement almost every day.  She has not seen any blood in her stool and she doesn't have any abdominal pain.  She had a paternal aunt who had colorectal cancer.  Her father did not have polyps to her knowledge lived to be 94.  She did have one prior colonoscopy or her prep was inadequate.  This required enemas at the time of her arrival as well as additional mag citrate prior to the procedure.  This is a same year that she had to go to the emergency room.    Past Medical History:   Past Medical History:   Diagnosis Date   • Family hx of colon cancer    • Hypertension    • Hypothyroidism    • PONV (postoperative nausea and vomiting)        Past Surgical History:  Past Surgical History:   Procedure Laterality Date   • BREAST CYST ASPIRATION     • COLONOSCOPY  06/21/2006   • HEMORRHOIDECTOMY     • OTHER SURGICAL HISTORY      rectocele repair   • TUBAL ABDOMINAL LIGATION  1982       Family History:  Family History   Problem Relation Age of Onset   • Colon cancer Paternal Aunt        Social  "History:   reports that she quit smoking about 34 years ago. She smoked 1.00 pack per day. She has never used smokeless tobacco. She reports that she drinks alcohol. She reports that she does not use illicit drugs.    Medications:   Prescriptions Prior to Admission   Medication Sig Dispense Refill Last Dose   • aspirin 81 MG EC tablet Take 81 mg by mouth 3 (Three) Times a Week.   Past Week at Unknown time   • levothyroxine (SYNTHROID, LEVOTHROID) 88 MCG tablet 88 mcg Daily.   10/17/2017 at Unknown time   • raNITIdine (ZANTAC) 150 MG tablet Take 150 mg by mouth Daily.   Past Week at Unknown time       Allergies:  Cefdinir; Hydrocodone; Levofloxacin; Lisinopril; and Penicillins    ROS:    Pertinent items are noted in HPI, all other systems reviewed and negative     Objective     Blood pressure 150/84, pulse 62, temperature 97.7 °F (36.5 °C), temperature source Oral, resp. rate 16, height 68\" (172.7 cm), weight 155 lb (70.3 kg), SpO2 98 %.    Physical Exam   Constitutional: Pt is oriented to person, place, and time and well-developed, well-nourished, and in no distress.   Mouth/Throat: Oropharynx is clear and moist.   Neck: Normal range of motion.   Cardiovascular: Normal rate, regular rhythm   Pulmonary/Chest: Effort normal   Abdominal: Soft. Nontender  Skin: Skin is warm and dry.   Psychiatric: Mood, memory, affect and judgment normal.     Assessment/Plan     Diagnosis:  personal history of polyps    Anticipated Surgical Procedure:  colonoscopy    The risks, benefits, and alternatives of this procedure have been discussed with the patient or the responsible party- the patient understands and agrees to proceed.                                                            " Statement Selected

## 2023-06-26 PROBLEM — R11.2 N&V (NAUSEA AND VOMITING): Status: RESOLVED | Noted: 2017-08-30 | Resolved: 2023-06-26

## 2023-06-26 PROBLEM — Z98.890 HISTORY OF LOOP RECORDER: Status: ACTIVE | Noted: 2023-06-26

## 2023-06-26 PROBLEM — R93.1 AGATSTON CAC SCORE, >400: Status: ACTIVE | Noted: 2023-06-26

## 2023-08-08 ENCOUNTER — TELEPHONE (OUTPATIENT)
Dept: CARDIOLOGY | Facility: CLINIC | Age: 78
End: 2023-08-08
Payer: MEDICARE

## 2023-08-08 NOTE — TELEPHONE ENCOUNTER
Patient with ILR monitor, remote transmission reviewed today documented 5 AT/AF labeled events from 7/22-/7/31/23, ECG's suggestive of AF with the longest event 31 minutes 30 seconds, all events occurred during nocturnal hours with average V rates 60-65 BPM. AF burden documented @ 0 %. Patient not on OAC.        AF labeled event on 7/22/23: 31 minutes:

## 2023-08-11 ENCOUNTER — TRANSCRIBE ORDERS (OUTPATIENT)
Dept: ADMINISTRATIVE | Facility: HOSPITAL | Age: 78
End: 2023-08-11
Payer: MEDICARE

## 2023-08-11 DIAGNOSIS — Z12.31 VISIT FOR SCREENING MAMMOGRAM: Primary | ICD-10-CM

## 2023-08-24 ENCOUNTER — CLINICAL SUPPORT NO REQUIREMENTS (OUTPATIENT)
Dept: CARDIOLOGY | Facility: CLINIC | Age: 78
End: 2023-08-24
Payer: MEDICARE

## 2023-08-24 DIAGNOSIS — R55 SYNCOPE AND COLLAPSE: Primary | ICD-10-CM

## 2023-09-06 ENCOUNTER — TELEPHONE (OUTPATIENT)
Dept: GASTROENTEROLOGY | Facility: CLINIC | Age: 78
End: 2023-09-06
Payer: MEDICARE

## 2023-09-06 NOTE — TELEPHONE ENCOUNTER
PT HAS A PROCEDURE SCHEDULED FOR 9/12 AND SHE HAS PREP QUESTIONS.  SHE WOULD LIKE A CALL BACK.  [phone]

## 2023-09-11 RX ORDER — ROSUVASTATIN CALCIUM 5 MG/1
5 TABLET, COATED ORAL DAILY
Qty: 30 TABLET | Refills: 11 | COMMUNITY
Start: 2023-08-10 | End: 2024-08-09

## 2023-09-12 ENCOUNTER — HOSPITAL ENCOUNTER (OUTPATIENT)
Facility: HOSPITAL | Age: 78
Setting detail: HOSPITAL OUTPATIENT SURGERY
Discharge: HOME | End: 2023-09-12
Attending: INTERNAL MEDICINE | Admitting: INTERNAL MEDICINE
Payer: MEDICARE

## 2023-09-12 ENCOUNTER — ANESTHESIA (OUTPATIENT)
Dept: GASTROENTEROLOGY | Facility: HOSPITAL | Age: 78
End: 2023-09-12
Payer: MEDICARE

## 2023-09-12 ENCOUNTER — ANESTHESIA EVENT (OUTPATIENT)
Dept: GASTROENTEROLOGY | Facility: HOSPITAL | Age: 78
End: 2023-09-12
Payer: MEDICARE

## 2023-09-12 VITALS
WEIGHT: 156.8 LBS | RESPIRATION RATE: 15 BRPM | SYSTOLIC BLOOD PRESSURE: 138 MMHG | OXYGEN SATURATION: 95 % | DIASTOLIC BLOOD PRESSURE: 93 MMHG | BODY MASS INDEX: 23.76 KG/M2 | HEIGHT: 68 IN | HEART RATE: 60 BPM

## 2023-09-12 DIAGNOSIS — Z80.0 FH: COLON CANCER: ICD-10-CM

## 2023-09-12 DIAGNOSIS — Z86.010 HISTORY OF COLON POLYPS: ICD-10-CM

## 2023-09-12 PROCEDURE — 88305 TISSUE EXAM BY PATHOLOGIST: CPT | Performed by: INTERNAL MEDICINE

## 2023-09-12 PROCEDURE — S0260 H&P FOR SURGERY: HCPCS | Performed by: INTERNAL MEDICINE

## 2023-09-12 PROCEDURE — 25010000002 ONDANSETRON PER 1 MG: Performed by: NURSE ANESTHETIST, CERTIFIED REGISTERED

## 2023-09-12 PROCEDURE — 25010000002 PROPOFOL 10 MG/ML EMULSION: Performed by: NURSE ANESTHETIST, CERTIFIED REGISTERED

## 2023-09-12 RX ORDER — PROPOFOL 10 MG/ML
VIAL (ML) INTRAVENOUS CONTINUOUS PRN
Status: DISCONTINUED | OUTPATIENT
Start: 2023-09-12 | End: 2023-09-12 | Stop reason: SURG

## 2023-09-12 RX ORDER — ONDANSETRON 2 MG/ML
INJECTION INTRAMUSCULAR; INTRAVENOUS AS NEEDED
Status: DISCONTINUED | OUTPATIENT
Start: 2023-09-12 | End: 2023-09-12 | Stop reason: SURG

## 2023-09-12 RX ORDER — LIDOCAINE HYDROCHLORIDE 20 MG/ML
INJECTION, SOLUTION INFILTRATION; PERINEURAL AS NEEDED
Status: DISCONTINUED | OUTPATIENT
Start: 2023-09-12 | End: 2023-09-12 | Stop reason: SURG

## 2023-09-12 RX ORDER — SODIUM CHLORIDE, SODIUM LACTATE, POTASSIUM CHLORIDE, CALCIUM CHLORIDE 600; 310; 30; 20 MG/100ML; MG/100ML; MG/100ML; MG/100ML
30 INJECTION, SOLUTION INTRAVENOUS CONTINUOUS PRN
Status: DISCONTINUED | OUTPATIENT
Start: 2023-09-12 | End: 2023-09-12 | Stop reason: HOSPADM

## 2023-09-12 RX ORDER — PROPOFOL 10 MG/ML
VIAL (ML) INTRAVENOUS AS NEEDED
Status: DISCONTINUED | OUTPATIENT
Start: 2023-09-12 | End: 2023-09-12 | Stop reason: SURG

## 2023-09-12 RX ORDER — GLYCOPYRROLATE 0.2 MG/ML
INJECTION INTRAMUSCULAR; INTRAVENOUS AS NEEDED
Status: DISCONTINUED | OUTPATIENT
Start: 2023-09-12 | End: 2023-09-12 | Stop reason: SURG

## 2023-09-12 RX ADMIN — GLYCOPYRROLATE 0.1 MG: 0.2 INJECTION INTRAMUSCULAR; INTRAVENOUS at 08:03

## 2023-09-12 RX ADMIN — PROPOFOL 130 MG: 10 INJECTION, EMULSION INTRAVENOUS at 08:05

## 2023-09-12 RX ADMIN — Medication 140 MCG/KG/MIN: at 08:05

## 2023-09-12 RX ADMIN — SODIUM CHLORIDE, POTASSIUM CHLORIDE, SODIUM LACTATE AND CALCIUM CHLORIDE 30 ML/HR: 600; 310; 30; 20 INJECTION, SOLUTION INTRAVENOUS at 07:53

## 2023-09-12 RX ADMIN — ONDANSETRON 4 MG: 2 INJECTION INTRAMUSCULAR; INTRAVENOUS at 08:05

## 2023-09-12 RX ADMIN — LIDOCAINE HYDROCHLORIDE 60 MG: 20 INJECTION, SOLUTION INFILTRATION; PERINEURAL at 08:05

## 2023-09-12 NOTE — DISCHARGE INSTRUCTIONS
For the next 24 hours patient needs to be with a responsible adult.    For 24 hours DO NOT drive, operate machinery, appliances, drink alcohol, make important decisions or sign legal documents.    Start with a light or bland diet if you are feeling sick to your stomach otherwise advance to regular diet as tolerated.    Follow recommendations on procedure report if provided by your doctor.    Call Dr Borden for problems [phone]. Await pathology result in 7-10 days.    Problems may include but not limited to: large amounts of bleeding, trouble breathing, repeated vomiting, severe unrelieved pain, fever or chills.

## 2023-09-12 NOTE — ANESTHESIA POSTPROCEDURE EVALUATION
"Patient: Anabell Damian    Procedure Summary       Date: 09/12/23 Room / [facility]:  [facility]    Anesthesia Start: 0757 Anesthesia Stop: 0841    Procedure: COLONOSCOPY to the cecum with cold snare polypectomy Diagnosis:       History of colon polyps      FH: colon cancer      (History of colon polyps [Z86.010])      (FH: colon cancer [Z80.0])    Surgeons: Rosanne Borden MD Provider: Riya Campbell MD    Anesthesia Type: MAC ASA Status: 3            Anesthesia Type: MAC    Vitals  Vitals Value Taken Time   /93 09/12/23 0859   Temp     Pulse 60 09/12/23 0859   Resp 15 09/12/23 0859   SpO2 95 % 09/12/23 0859           Post Anesthesia Care and Evaluation    Patient location during evaluation: PHASE II  Patient participation: complete - patient participated  Level of consciousness: awake and alert  Pain management: adequate    Airway patency: patent  Anesthetic complications: No anesthetic complications    Cardiovascular status: acceptable  Respiratory status: acceptable  Hydration status: acceptable    Comments: /93 (BP Location: Left arm, Patient Position: Lying)   Pulse 60   Resp 15   Ht 172.7 cm (68\")   Wt 71.1 kg (156 lb 12.8 oz)   SpO2 95%   BMI 23.84 kg/m²       "

## 2023-09-12 NOTE — H&P
[facility]  Pre Procedure History & Physical    Chief Complaint:   H/o polyps    Subjective     HPI:   77 yo here today for colonoscopy.     Patient reports chronic irregularity - takes beenfiber 2 tsp daily-h/o rectocele repair.  Last exam 2020    Past Medical History:   Past Medical History:   Diagnosis Date    Adenomatous polyp of colon 09/02/2020    Agatston CAC score, >400     429 in 2021: LM 43, , Cx 82, RCA 28    Benign paroxysmal positional vertigo due to bilateral vestibular disorder 08/30/2017    GERD without esophagitis 11/16/2022    History of colon polyps 03/22/2023    History of loop recorder 06/26/2023    Hypertension     Hypothyroidism     PONV (postoperative nausea and vomiting)     Syncope and collapse 08/30/2017       Past Surgical History:  Past Surgical History:   Procedure Laterality Date    BREAST CYST ASPIRATION      COLONOSCOPY  06/21/2006    COLONOSCOPY N/A 10/18/2017    Procedure: COLONOSCOPY into cecum with cold biopsy polypectomy and hot snare polypectomies;  Surgeon: Rosanne Borden MD;  Location:  [facility];  Service:     COLONOSCOPY N/A 9/22/2020    Procedure: COLONOSCOPY into cecum with cold biopsy polypectomies;  Surgeon: Rosanne Borden MD;  Location:  [facility];  Service: Gastroenterology;  Laterality: N/A;  Pre op: History of Polyps  Post op: Diverticulosis, Polyps    HEMORRHOIDECTOMY      OTHER SURGICAL HISTORY      rectocele repair    TUBAL ABDOMINAL LIGATION  1982       Family History:  Family History   Problem Relation Age of Onset    Colon cancer Paternal Aunt     Breast cancer Neg Hx     Malig Hyperthermia Neg Hx        Social History:   reports that she quit smoking about 40 years ago. Her smoking use included cigarettes. She smoked an average of 1 pack per day. She has never used smokeless tobacco. She reports current alcohol use. She reports that she does not use drugs.    Medications:   Medications Prior to Admission  "  Medication Sig Dispense Refill Last Dose    APPLE CIDER VINEGAR PO Take  by mouth.   Past Week    BIOTIN PO Take  by mouth.   9/11/2023    cetirizine (zyrTEC) 10 MG tablet Take 1 tablet by mouth Daily.   Past Week    Cholecalciferol (VITAMIN D3) 75 MCG (3000 UT) tablet Take 1 tablet by mouth Daily.   Past Week    CRANBERRY PO Take 1,680 mg by mouth Daily.   Past Week    cyanocobalamin (VITAMIN B-12) 500 MCG tablet Take 1 tablet by mouth Daily.   Past Week    famotidine (PEPCID) 40 MG tablet Take 1 tablet by mouth every night at bedtime.   9/11/2023    GARLIC PO Take  by mouth.   Past Week    levothyroxine (SYNTHROID, LEVOTHROID) 88 MCG tablet 1 tablet Daily.   9/11/2023    MAGNESIUM PO Take  by mouth.   Past Week    Omega-3 Fatty Acids (FISH OIL) 1000 MG capsule capsule Take  by mouth Daily.   Past Week    rosuvastatin (CRESTOR) 5 MG tablet Take 1 tablet by mouth Daily. 30 tablet 11 9/11/2023    spironolactone (ALDACTONE) 25 MG tablet Take 0.5 tablets by mouth Daily.   9/11/2023    vitamin B-6 (PYRIDOXINE) 100 MG tablet Take 1 tablet by mouth Daily.   Past Week    vitamin C (ASCORBIC ACID) 500 MG tablet Take 8 tablets by mouth Daily.   Past Week       Allergies:  Cefdinir, Atorvastatin, Clindamycin, Hydrocodone, Levofloxacin, Lisinopril, and Penicillins    ROS:    Pertinent items are noted in HPI     Objective     Blood pressure 133/65, pulse 56, resp. rate 18, height 172.7 cm (68\"), weight 71.1 kg (156 lb 12.8 oz), SpO2 99 %.    Physical Exam   Constitutional: Pt is oriented to person, place, and time and well-developed, well-nourished, and in no distress.   Mouth/Throat: Oropharynx is clear and moist.   Neck: Normal range of motion.   Cardiovascular: Normal rate, regular rhythm    Pulmonary/Chest: Effort normal    Abdominal: Soft. Nontender  Skin: Skin is warm and dry.   Psychiatric: Mood, memory, affect and judgment normal.     Assessment & Plan     Diagnosis:  H/o polyps    Anticipated Surgical " Procedure:  colonoscopy    The risks, benefits, and alternatives of this procedure have been discussed with the patient or the responsible party- the patient understands and agrees to proceed.

## 2023-09-12 NOTE — ANESTHESIA PREPROCEDURE EVALUATION
Anesthesia Evaluation     history of anesthetic complications:  PONV               Airway   Mallampati: II  TM distance: >3 FB  Neck ROM: full  Dental      Comment: Bridge across top    Pulmonary    (+) a smoker Former,  Cardiovascular     (+) hypertension, CAD, dysrhythmias (maybe)      Neuro/Psych  (+) syncope (has had a neg heart w/u but has animplanted Loop device)  GI/Hepatic/Renal/Endo    (+) GERD, thyroid problem     Musculoskeletal     Abdominal    Substance History      OB/GYN          Other                      Anesthesia Plan    ASA 3     MAC     intravenous induction     Anesthetic plan, risks, benefits, and alternatives have been provided, discussed and informed consent has been obtained with: patient.    CODE STATUS:

## 2023-09-13 ENCOUNTER — TELEPHONE (OUTPATIENT)
Dept: GASTROENTEROLOGY | Facility: CLINIC | Age: 78
End: 2023-09-13
Payer: MEDICARE

## 2023-09-13 LAB
LAB AP CASE REPORT: NORMAL
PATH REPORT.FINAL DX SPEC: NORMAL
PATH REPORT.GROSS SPEC: NORMAL

## 2023-09-13 NOTE — TELEPHONE ENCOUNTER
----- Message from Rosanne Borden MD sent at 9/13/2023 11:26 AM EDT -----  The polyp(s) showed hyperplastic change, which is non-cancerous and not pre-cancerous. Would defer further colon cancer screening due to age and low risk findings on recent examination

## 2023-09-13 NOTE — PROGRESS NOTES
The polyp(s) showed hyperplastic change, which is non-cancerous and not pre-cancerous. Would defer further colon cancer screening due to age and low risk findings on recent examination

## 2023-09-13 NOTE — TELEPHONE ENCOUNTER
Hub staff attempted to follow warm transfer process and was unsuccessful     Caller: Anabell Damian    Relationship to patient: Self    Best call back number: [phone]    Patient is needing: PT IS RETURNING ALPHONSO'S CALL FOR RESULTS.

## 2023-10-02 ENCOUNTER — HOSPITAL ENCOUNTER (OUTPATIENT)
Dept: MAMMOGRAPHY | Facility: HOSPITAL | Age: 78
Discharge: HOME | End: 2023-10-02
Admitting: INTERNAL MEDICINE
Payer: MEDICARE

## 2023-10-02 DIAGNOSIS — Z12.31 VISIT FOR SCREENING MAMMOGRAM: ICD-10-CM

## 2023-10-02 PROCEDURE — 77063 BREAST TOMOSYNTHESIS BI: CPT

## 2023-10-02 PROCEDURE — 77067 SCR MAMMO BI INCL CAD: CPT

## 2023-10-07 PROCEDURE — G2066 INTER DEVC REMOTE 30D: HCPCS | Performed by: INTERNAL MEDICINE

## 2023-10-07 PROCEDURE — 93298 REM INTERROG DEV EVAL SCRMS: CPT | Performed by: INTERNAL MEDICINE

## 2024-02-24 ENCOUNTER — HOSPITAL ENCOUNTER (EMERGENCY)
Facility: HOSPITAL | Age: 79
Discharge: HOME | End: 2024-02-24
Attending: EMERGENCY MEDICINE
Payer: MEDICARE

## 2024-02-24 VITALS
HEART RATE: 69 BPM | DIASTOLIC BLOOD PRESSURE: 67 MMHG | TEMPERATURE: 98.6 F | SYSTOLIC BLOOD PRESSURE: 138 MMHG | RESPIRATION RATE: 18 BRPM | HEIGHT: 68 IN | WEIGHT: 153 LBS | OXYGEN SATURATION: 100 % | BODY MASS INDEX: 23.19 KG/M2

## 2024-02-24 DIAGNOSIS — R11.2 NAUSEA AND VOMITING, UNSPECIFIED VOMITING TYPE: Primary | ICD-10-CM

## 2024-02-24 DIAGNOSIS — R55 VASOVAGAL SYNCOPE: ICD-10-CM

## 2024-02-24 LAB
ALBUMIN SERPL-MCNC: 4.9 G/DL (ref 3.5–5.2)
ALBUMIN/GLOB SERPL: 2 G/DL
ALP SERPL-CCNC: 103 U/L (ref 39–117)
ALT SERPL W P-5'-P-CCNC: 34 U/L (ref 1–33)
ANION GAP SERPL CALCULATED.3IONS-SCNC: 14 MMOL/L (ref 5–15)
AST SERPL-CCNC: 37 U/L (ref 1–32)
BASOPHILS # BLD AUTO: 0.02 10*3/MM3 (ref 0–0.2)
BASOPHILS NFR BLD AUTO: 0.2 % (ref 0–1.5)
BILIRUB SERPL-MCNC: 0.7 MG/DL (ref 0–1.2)
BUN SERPL-MCNC: 20 MG/DL (ref 8–23)
BUN/CREAT SERPL: 21.7 (ref 7–25)
CALCIUM SPEC-SCNC: 10.1 MG/DL (ref 8.6–10.5)
CHLORIDE SERPL-SCNC: 101 MMOL/L (ref 98–107)
CO2 SERPL-SCNC: 26 MMOL/L (ref 22–29)
CREAT SERPL-MCNC: 0.92 MG/DL (ref 0.57–1)
DEPRECATED RDW RBC AUTO: 45.3 FL (ref 37–54)
EGFRCR SERPLBLD CKD-EPI 2021: 63.1 ML/MIN/1.73
EOSINOPHIL # BLD AUTO: 0 10*3/MM3 (ref 0–0.4)
EOSINOPHIL NFR BLD AUTO: 0 % (ref 0.3–6.2)
ERYTHROCYTE [DISTWIDTH] IN BLOOD BY AUTOMATED COUNT: 13.7 % (ref 12.3–15.4)
GLOBULIN UR ELPH-MCNC: 2.5 GM/DL
GLUCOSE SERPL-MCNC: 124 MG/DL (ref 65–99)
HCT VFR BLD AUTO: 41.4 % (ref 34–46.6)
HGB BLD-MCNC: 13.6 G/DL (ref 12–15.9)
IMM GRANULOCYTES # BLD AUTO: 0.03 10*3/MM3 (ref 0–0.05)
IMM GRANULOCYTES NFR BLD AUTO: 0.3 % (ref 0–0.5)
LIPASE SERPL-CCNC: 41 U/L (ref 13–60)
LYMPHOCYTES # BLD AUTO: 0.87 10*3/MM3 (ref 0.7–3.1)
LYMPHOCYTES NFR BLD AUTO: 7.5 % (ref 19.6–45.3)
MAGNESIUM SERPL-MCNC: 2.4 MG/DL (ref 1.6–2.4)
MCH RBC QN AUTO: 29.8 PG (ref 26.6–33)
MCHC RBC AUTO-ENTMCNC: 32.9 G/DL (ref 31.5–35.7)
MCV RBC AUTO: 90.6 FL (ref 79–97)
MONOCYTES # BLD AUTO: 0.81 10*3/MM3 (ref 0.1–0.9)
MONOCYTES NFR BLD AUTO: 7 % (ref 5–12)
NEUTROPHILS NFR BLD AUTO: 85 % (ref 42.7–76)
NEUTROPHILS NFR BLD AUTO: 9.88 10*3/MM3 (ref 1.7–7)
NRBC BLD AUTO-RTO: 0 /100 WBC (ref 0–0.2)
PLATELET # BLD AUTO: 261 10*3/MM3 (ref 140–450)
PMV BLD AUTO: 10.1 FL (ref 6–12)
POTASSIUM SERPL-SCNC: 4.2 MMOL/L (ref 3.5–5.2)
PROT SERPL-MCNC: 7.4 G/DL (ref 6–8.5)
RBC # BLD AUTO: 4.57 10*6/MM3 (ref 3.77–5.28)
SODIUM SERPL-SCNC: 141 MMOL/L (ref 136–145)
TROPONIN T SERPL HS-MCNC: 20 NG/L
WBC NRBC COR # BLD AUTO: 11.61 10*3/MM3 (ref 3.4–10.8)

## 2024-02-24 PROCEDURE — 80053 COMPREHEN METABOLIC PANEL: CPT | Performed by: EMERGENCY MEDICINE

## 2024-02-24 PROCEDURE — 25810000003 LACTATED RINGERS SOLUTION: Performed by: EMERGENCY MEDICINE

## 2024-02-24 PROCEDURE — 99283 EMERGENCY DEPT VISIT LOW MDM: CPT

## 2024-02-24 PROCEDURE — 96374 THER/PROPH/DIAG INJ IV PUSH: CPT

## 2024-02-24 PROCEDURE — 93005 ELECTROCARDIOGRAM TRACING: CPT | Performed by: EMERGENCY MEDICINE

## 2024-02-24 PROCEDURE — 93010 ELECTROCARDIOGRAM REPORT: CPT | Performed by: INTERNAL MEDICINE

## 2024-02-24 PROCEDURE — 85025 COMPLETE CBC W/AUTO DIFF WBC: CPT | Performed by: EMERGENCY MEDICINE

## 2024-02-24 PROCEDURE — 25010000002 ONDANSETRON PER 1 MG: Performed by: EMERGENCY MEDICINE

## 2024-02-24 PROCEDURE — 84484 ASSAY OF TROPONIN QUANT: CPT | Performed by: EMERGENCY MEDICINE

## 2024-02-24 PROCEDURE — 83690 ASSAY OF LIPASE: CPT | Performed by: EMERGENCY MEDICINE

## 2024-02-24 PROCEDURE — 83735 ASSAY OF MAGNESIUM: CPT | Performed by: EMERGENCY MEDICINE

## 2024-02-24 PROCEDURE — 63710000001 ONDANSETRON ODT 4 MG TABLET DISPERSIBLE: Performed by: EMERGENCY MEDICINE

## 2024-02-24 RX ORDER — ONDANSETRON 4 MG/1
4 TABLET, ORALLY DISINTEGRATING ORAL ONCE
Status: COMPLETED | OUTPATIENT
Start: 2024-02-24 | End: 2024-02-24

## 2024-02-24 RX ORDER — ONDANSETRON 2 MG/ML
4 INJECTION INTRAMUSCULAR; INTRAVENOUS ONCE
Status: COMPLETED | OUTPATIENT
Start: 2024-02-24 | End: 2024-02-24

## 2024-02-24 RX ORDER — SODIUM CHLORIDE 0.9 % (FLUSH) 0.9 %
10 SYRINGE (ML) INJECTION AS NEEDED
Status: DISCONTINUED | OUTPATIENT
Start: 2024-02-24 | End: 2024-02-24 | Stop reason: HOSPADM

## 2024-02-24 RX ORDER — ONDANSETRON 4 MG/1
4 TABLET, ORALLY DISINTEGRATING ORAL EVERY 6 HOURS PRN
Qty: 15 TABLET | Refills: 0 | Status: SHIPPED | OUTPATIENT
Start: 2024-02-24

## 2024-02-24 RX ADMIN — ONDANSETRON 4 MG: 2 INJECTION INTRAMUSCULAR; INTRAVENOUS at 19:28

## 2024-02-24 RX ADMIN — SODIUM CHLORIDE, POTASSIUM CHLORIDE, SODIUM LACTATE AND CALCIUM CHLORIDE 1000 ML: 600; 310; 30; 20 INJECTION, SOLUTION INTRAVENOUS at 19:27

## 2024-02-24 RX ADMIN — ONDANSETRON 4 MG: 4 TABLET, ORALLY DISINTEGRATING ORAL at 21:14

## 2024-02-25 LAB
QT INTERVAL: 425 MS
QTC INTERVAL: 456 MS

## 2024-02-25 NOTE — ED TRIAGE NOTES
Pt to ED via personal vehicle with complaints of vomiting that started last night 2200, stopped around 0900 this morning. Pt drank some tea and ate ice chips around 1000 and started vomiting 1700.

## 2024-02-25 NOTE — DISCHARGE INSTRUCTIONS
Take Zofran as prescribed.  Return to the emergency department for persistent vomiting, fever, abdominal pain, chest pain, or other concern.  Follow-up with your primary care provider if symptoms persist.  Follow-up with Dr. Meehan.

## 2024-02-25 NOTE — ED PROVIDER NOTES
" EMERGENCY DEPARTMENT ENCOUNTER    Room Number:  39/39  PCP: Jennifer Lauren MD  Historian: Patient, spouse      HPI:  Chief Complaint: Nausea, vomiting  A complete HPI/ROS/PMH/PSH/SH/FH are unobtainable due to: Nothing  Context: Anabell Damian is a 78 y.o. female with a medical history of hypothyroidism, hypertension, vertigo, syncope, who presents to the ED c/o acute nausea and vomiting.  Symptoms began around 10 PM last night.  Patient had multiple episodes of nonbilious nonbloody vomiting vomiting throughout the night.  She felt better this morning and was able to tolerate some ice chips and some tea.  However, she began vomiting again around 5 PM this afternoon.  She denies fever, chills, chest pain, shortness of breath, abdominal pain, diarrhea, or dysuria.  She had a normal bowel movement this morning.  She also reports having a couple of brief syncopal episodes last night.   reports that she would vomit and then \"stopped talking and briefly faint\".  She denies preceding chest pain, palpitations, headache, or abdominal pain.  She has a history of syncope and has a loop recorder.            PAST MEDICAL HISTORY  Active Ambulatory Problems     Diagnosis Date Noted    Syncope and collapse 08/30/2017    Essential hypertension 08/30/2017    Benign paroxysmal positional vertigo due to bilateral vestibular disorder 08/30/2017    Adenomatous polyp of colon 09/02/2020    FH: colon cancer 09/02/2020    Encounter for screening for malignant neoplasm of colon 09/02/2020    Hypothyroidism (acquired) 11/16/2022    GERD without esophagitis 11/16/2022    History of colon polyps 03/22/2023    History of loop recorder 06/26/2023    Agatston CAC score, >400 06/26/2023     Resolved Ambulatory Problems     Diagnosis Date Noted    N&V (nausea and vomiting) 08/30/2017     Past Medical History:   Diagnosis Date    Hypertension     Hypothyroidism     PONV (postoperative nausea and vomiting)          PAST SURGICAL HISTORY  Past " Surgical History:   Procedure Laterality Date    BREAST CYST ASPIRATION      COLONOSCOPY  2006    COLONOSCOPY N/A 10/18/2017    Procedure: COLONOSCOPY into cecum with cold biopsy polypectomy and hot snare polypectomies;  Surgeon: Rosanne Borden MD;  Location:  [facility];  Service:     COLONOSCOPY N/A 2020    Procedure: COLONOSCOPY into cecum with cold biopsy polypectomies;  Surgeon: Rosanne Borden MD;  Location:  [facility];  Service: Gastroenterology;  Laterality: N/A;  Pre op: History of Polyps  Post op: Diverticulosis, Polyps    COLONOSCOPY N/A 2023    Procedure: COLONOSCOPY to the cecum with cold snare polypectomy;  Surgeon: Rosanne Borden MD;  Location:  [facility];  Service: Gastroenterology;  Laterality: N/A;  preop-hx of polyps  postop-diverticulosis; hemorrhoids; polyp    HEMORRHOIDECTOMY      OTHER SURGICAL HISTORY      rectocele repair    TUBAL ABDOMINAL LIGATION           FAMILY HISTORY  Family History   Problem Relation Age of Onset    Colon cancer Paternal Aunt     Breast cancer Neg Hx     Malig Hyperthermia Neg Hx          SOCIAL HISTORY  Social History     Socioeconomic History    Marital status:    Tobacco Use    Smoking status: Former     Packs/day: 1     Types: Cigarettes     Quit date:      Years since quittin.1    Smokeless tobacco: Never   Vaping Use    Vaping Use: Never used   Substance and Sexual Activity    Alcohol use: Yes     Comment: 4-5 drinks per week    Drug use: No    Sexual activity: Defer         ALLERGIES  Cefdinir, Atorvastatin, Clindamycin, Hydrocodone, Levofloxacin, Lisinopril, and Penicillins    REVIEW OF SYSTEMS  Review of Systems  Included in HPI  All systems reviewed and negative except for those discussed in HPI.      PHYSICAL EXAM  ED Triage Vitals   Temp Heart Rate Resp BP SpO2   24 1858 24 1858 24 1858 24 1905 24 1858   98.6 °F (37 °C) 79 18 167/85 98 %      Temp src Heart Rate  Source Patient Position BP Location FiO2 (%)   02/24/24 9678 -- -- -- --   Tympanic           Physical Exam      GENERAL: Awake, alert, oriented x 3.  Well-developed, well-nourished elderly female.  Resting comfortably in no acute distress  HENT: NCAT, nares patent, mildly dry mucous membranes  EYES: no scleral icterus  CV: regular rhythm, normal rate  RESPIRATORY: normal effort, clear to auscultation bilaterally  ABDOMEN: soft, nondistended, nontender  MUSCULOSKELETAL: Extremities are nontender with full range of motion  NEURO: Speech is normal.  No facial droop.  Normal strength in all extremities.  Follows commands.  PSYCH:  calm, cooperative  SKIN: warm, dry    Vital signs and nursing notes reviewed.          LAB RESULTS  Recent Results (from the past 24 hour(s))   Comprehensive Metabolic Panel    Collection Time: 02/24/24  7:26 PM    Specimen: Blood   Result Value Ref Range    Glucose 124 (H) 65 - 99 mg/dL    BUN 20 8 - 23 mg/dL    Creatinine 0.92 0.57 - 1.00 mg/dL    Sodium 141 136 - 145 mmol/L    Potassium 4.2 3.5 - 5.2 mmol/L    Chloride 101 98 - 107 mmol/L    CO2 26.0 22.0 - 29.0 mmol/L    Calcium 10.1 8.6 - 10.5 mg/dL    Total Protein 7.4 6.0 - 8.5 g/dL    Albumin 4.9 3.5 - 5.2 g/dL    ALT (SGPT) 34 (H) 1 - 33 U/L    AST (SGOT) 37 (H) 1 - 32 U/L    Alkaline Phosphatase 103 39 - 117 U/L    Total Bilirubin 0.7 0.0 - 1.2 mg/dL    Globulin 2.5 gm/dL    A/G Ratio 2.0 g/dL    BUN/Creatinine Ratio 21.7 7.0 - 25.0    Anion Gap 14.0 5.0 - 15.0 mmol/L    eGFR 63.1 >60.0 mL/min/1.73   Lipase    Collection Time: 02/24/24  7:26 PM    Specimen: Blood   Result Value Ref Range    Lipase 41 13 - 60 U/L   CBC Auto Differential    Collection Time: 02/24/24  7:26 PM    Specimen: Blood   Result Value Ref Range    WBC 11.61 (H) 3.40 - 10.80 10*3/mm3    RBC 4.57 3.77 - 5.28 10*6/mm3    Hemoglobin 13.6 12.0 - 15.9 g/dL    Hematocrit 41.4 34.0 - 46.6 %    MCV 90.6 79.0 - 97.0 fL    MCH 29.8 26.6 - 33.0 pg    MCHC 32.9 31.5 - 35.7  g/dL    RDW 13.7 12.3 - 15.4 %    RDW-SD 45.3 37.0 - 54.0 fl    MPV 10.1 6.0 - 12.0 fL    Platelets 261 140 - 450 10*3/mm3    Neutrophil % 85.0 (H) 42.7 - 76.0 %    Lymphocyte % 7.5 (L) 19.6 - 45.3 %    Monocyte % 7.0 5.0 - 12.0 %    Eosinophil % 0.0 (L) 0.3 - 6.2 %    Basophil % 0.2 0.0 - 1.5 %    Immature Grans % 0.3 0.0 - 0.5 %    Neutrophils, Absolute 9.88 (H) 1.70 - 7.00 10*3/mm3    Lymphocytes, Absolute 0.87 0.70 - 3.10 10*3/mm3    Monocytes, Absolute 0.81 0.10 - 0.90 10*3/mm3    Eosinophils, Absolute 0.00 0.00 - 0.40 10*3/mm3    Basophils, Absolute 0.02 0.00 - 0.20 10*3/mm3    Immature Grans, Absolute 0.03 0.00 - 0.05 10*3/mm3    nRBC 0.0 0.0 - 0.2 /100 WBC   Single High Sensitivity Troponin T    Collection Time: 02/24/24  7:26 PM    Specimen: Blood   Result Value Ref Range    HS Troponin T 20 (H) <14 ng/L   Magnesium    Collection Time: 02/24/24  7:26 PM    Specimen: Blood   Result Value Ref Range    Magnesium 2.4 1.6 - 2.4 mg/dL   ECG 12 Lead Syncope    Collection Time: 02/24/24  7:31 PM   Result Value Ref Range    QT Interval 425 ms    QTC Interval 456 ms       Ordered the above labs and reviewed the results.        RADIOLOGY  No Radiology Exams Resulted Within Past 24 Hours    Ordered the above noted radiological studies. Reviewed by me in PACS.            PROCEDURES  Procedures        OUTPATIENT MEDICATION MANAGEMENT:  Current Facility-Administered Medications Ordered in Epic   Medication Dose Route Frequency Provider Last Rate Last Admin    ondansetron ODT (ZOFRAN-ODT) disintegrating tablet 4 mg  4 mg Oral Once Goran Power MD        sodium chloride 0.9 % flush 10 mL  10 mL Intravenous PRN Goran Power MD         Current Outpatient Medications Ordered in Epic   Medication Sig Dispense Refill    APPLE CIDER VINEGAR PO Take  by mouth.      BIOTIN PO Take  by mouth.      cetirizine (zyrTEC) 10 MG tablet Take 1 tablet by mouth Daily.      Cholecalciferol (VITAMIN D3) 75 MCG (3000 UT) tablet  Take 1 tablet by mouth Daily.      CRANBERRY PO Take 1,680 mg by mouth Daily.      cyanocobalamin (VITAMIN B-12) 500 MCG tablet Take 1 tablet by mouth Daily.      famotidine (PEPCID) 40 MG tablet Take 1 tablet by mouth every night at bedtime.      GARLIC PO Take  by mouth.      levothyroxine (SYNTHROID, LEVOTHROID) 88 MCG tablet 1 tablet Daily.      MAGNESIUM PO Take  by mouth.      Omega-3 Fatty Acids (FISH OIL) 1000 MG capsule capsule Take  by mouth Daily.      ondansetron ODT (ZOFRAN-ODT) 4 MG disintegrating tablet Place 1 tablet on the tongue Every 6 (Six) Hours As Needed for Nausea or Vomiting. 15 tablet 0    rosuvastatin (CRESTOR) 5 MG tablet Take 1 tablet by mouth Daily. 30 tablet 11    spironolactone (ALDACTONE) 25 MG tablet Take 0.5 tablets by mouth Daily.      vitamin B-6 (PYRIDOXINE) 100 MG tablet Take 1 tablet by mouth Daily.      vitamin C (ASCORBIC ACID) 500 MG tablet Take 8 tablets by mouth Daily.             MEDICATIONS GIVEN IN ER  Medications   sodium chloride 0.9 % flush 10 mL (has no administration in time range)   ondansetron ODT (ZOFRAN-ODT) disintegrating tablet 4 mg (has no administration in time range)   lactated ringers bolus 1,000 mL (1,000 mL Intravenous New Bag 2/24/24 1927)   ondansetron (ZOFRAN) injection 4 mg (4 mg Intravenous Given 2/24/24 1928)                   MEDICAL DECISION MAKING, PROGRESS, and CONSULTS    All labs have been independently reviewed by me.  All radiology studies have been reviewed by me and I have also reviewed the radiology report.   EKG's independently viewed and interpreted by me.  Discussion below represents my analysis of pertinent findings related to patient's condition, differential diagnosis, treatment plan and final disposition.      Additional sources:    - Discussed/ obtained information from independent historians: Spouse at bedside    - External (non-ED) record review: Patient was last admitted here November 2022 for syncope.  She was seen by  cardiology and neurology.  Syncope was felt to be due to orthostatic hypotension.  Loop recorder was placed.  Colonoscopy done in September 2023 showed a single polyp in the rectum which was removed.  There were multiple diverticula in the colon.  There were nonbleeding internal hemorrhoids.    Patient last saw Dr. Meehan, her cardiologist, in June 2023 for follow-up for syncope and hypertension.    -Prescription drug monitoring program review:     N/A    - Chronic or social conditions impacting patient care (Social Determinants of Health): None          Orders placed during this visit:  Orders Placed This Encounter   Procedures    Comprehensive Metabolic Panel    Lipase    CBC Auto Differential    Single High Sensitivity Troponin T    Magnesium    Monitor Blood Pressure    ECG 12 Lead Syncope    Insert Peripheral IV    CBC & Differential         Additional orders considered but not ordered:          Differential diagnosis includes, but is not limited to:    Gastroenteritis, dehydration, pancreatitis, esophagitis      Independent interpretation of labs, radiology studies, and discussions with consultants:  ED Course as of 02/24/24 2056   Sat Feb 24, 2024 1951 EKG personally interpreted by me at 1934.  My personal interpretation is:         EKG time: 1931  Rhythm/Rate: Sinus rhythm, rate 69  P waves and AR: Normal  QRS, axis: Normal axis, LVH, anterior Q waves  ST and T waves: Nonspecific ST/T wave changes in the lateral leads  QT interval is normal    Interpreted Contemporaneously by me, independently viewed  EKG is not significantly changed compared to prior EKG done on 11/15/2022   [WH]   1957 Magnesium: 2.4 [WH]   1957 Lipase: 41 [WH]   1957 WBC(!): 11.61 [WH]   1957 Hemoglobin: 13.6 [WH]   1957 HS Troponin T(!): 20 [WH]   1957 Glucose(!): 124 [WH]   1957 Creatinine: 0.92 [WH]   1958 CO2: 26.0 [WH]   1958 Anion Gap: 14.0 [WH]   2035 Patient is feeling better.  Test results were discussed with her.  She will be  given a p.o. challenge. [WH]   2053 Patient is tolerating p.o. fluids.  She will be discharged with a prescription for Zofran ODT.  Return precautions were discussed.  She was advised to follow-up with her PCP and cardiologist. [WH]   2055 MDM: Patient presented to ED complaining of nausea and vomiting since last night.  She also reported having a few very brief syncopal episodes that occurred after vomiting..  She has a history of syncope and has a loop recorder.  EKG was unchanged.  Labs were basically unremarkable.  Patient was not dehydrated.  She was given IV fluids and IV Zofran.  She was able to tolerate p.o.  Plan is for symptomatic treatment of likely gastroenteritis.  Syncope was most likely vasovagal. [WH]      ED Course User Index  [WH] Goran Power MD         COMPLEXITY OF CARE  Admission was considered but after careful review of the patient's presentation, physical examination, diagnostic results, and response to treatment the patient may be safely discharged with outpatient follow-up.      DIAGNOSIS  Final diagnoses:   Nausea and vomiting, unspecified vomiting type   Vasovagal syncope         DISPOSITION  DISCHARGE    Patient discharged in stable condition.    Reviewed implications of results, diagnosis, meds, responsibility to follow up, warning signs and symptoms of possible worsening, potential complications and reasons to return to ER, including persistent vomiting, abdominal pain, fever, recurrent fainting, chest pain, or other concern..    Patient/Family voiced understanding of above instructions.    Discussed plan for discharge, as there is no emergent indication for admission. Patient referred to primary care provider for BP management due to today's BP. Pt/family is agreeable and understands need for follow up and repeat testing.  Pt is aware that discharge does not mean that nothing is wrong but it indicates no emergency is present that requires admission and they must continue care  with follow-up as given below or physician of their choice.     FOLLOW-UP  Jennifer Lauren MD  [address]  [address]  [facility] [address]  [phone]    Schedule an appointment as soon as possible for a visit   If symptoms persist    Kamari Meehan MD  [address]  [address]  [facility] [address]  [phone]    Schedule an appointment as soon as possible for a visit            Medication List        New Prescriptions      ondansetron ODT 4 MG disintegrating tablet  Commonly known as: ZOFRAN-ODT  Place 1 tablet on the tongue Every 6 (Six) Hours As Needed for Nausea or Vomiting.               Where to Get Your Medications        These medications were sent to [facility] [id number] - [place], [place] - [address] AT [facility]. - [phone]  - [phone]   [address], [facility] [address]      Phone: [phone]   ondansetron ODT 4 MG disintegrating tablet                   Latest Documented Vital Signs:  AS OF 20:56 EST VITALS:    BP - 146/69  HR - 68  TEMP - 98.6 °F (37 °C) (Tympanic)  O2 SATS - 100%            --    Please note that portions of this were completed with a voice recognition program.       Note Disclaimer: At [facility], we believe that sharing information builds trust and better relationships. You are receiving this note because you are receiving care at [facility] or recently visited. It is possible you will see health information before a provider has talked with you about it. This kind of information can be easy to misunderstand. To help you fully understand what it means for your health, we urge you to discuss this note with your provider.             Goran Power MD  02/24/24 2057

## 2024-02-26 ENCOUNTER — TELEPHONE (OUTPATIENT)
Age: 79
End: 2024-02-26
Payer: MEDICARE

## 2024-02-26 NOTE — TELEPHONE ENCOUNTER
Patient with ICM, remote transmission reviewed this am documented 9 pause 4 luke and 6 patient triggered events from 2/23-2/24/24. Patient presented to [facility] on 2/24 @ 20:56 pm. Per epic note patient with c/o N/V with syncope. Strips reviewed documented true pause events with the longest events 31-35 seconds.  Please review strips, the long pause and luke events occurred mostly during nocturnal hours.    Log of events on 2/23-2/24/24:              Pause event 2/23/24 @ 10:01PM                        Pause with luke event 2/23/24 @ 11:13 pm:              Pause on 2/24/24 @ 1:57 am 35 seconds long:

## 2024-02-28 ENCOUNTER — TELEPHONE (OUTPATIENT)
Dept: CARDIOLOGY | Facility: CLINIC | Age: 79
End: 2024-02-28
Payer: MEDICARE

## 2024-02-28 NOTE — TELEPHONE ENCOUNTER
Caller: Anabell Damian    Relationship: Self    Best call back number:[phone]    What is the best time to reach you: ANY    Who are you requesting to speak with (clinical staff, provider,  specific staff member): CLINICAL      What was the call regarding: PATIENT IS CALLING BECAUSE SHE WAS SENT AS A REFERRAL TO DR GUAJARDO AFTER RECEIVING AN EVENT ON HER LOOP DEVICE, SHE WAS ALSO IN ER OVER THE WEEKEND FOR VOMITING AND PASSING OUT. SHE WOULD LIKE SOMEONE MEDICAL TO CALL BACK AND DISCUSS THE LOOP EVENT AND MAYBE THE CORRELATION TO THE PASSING OUT AND VOMITING  HER APPT WITH DR VAZQUEZ IS 4/16/24.  SHE HAS TRIP OUT OF THE COUNTRY PLANNED FOR 4/17/24 AND WOULD LIKE TO KNOW THE LIKELIHOOD OF THAT STILL BEING SAFE TO DO?

## 2024-03-04 NOTE — TELEPHONE ENCOUNTER
Spoke to pt    Hortensia, is there any availability to get her in sooner at all? She has an appointment 4/16 to discuss pacer placement and is going to be out of the country on 4/17.

## 2024-03-19 ENCOUNTER — OFFICE VISIT (OUTPATIENT)
Age: 79
End: 2024-03-19
Payer: MEDICARE

## 2024-03-19 VITALS
HEIGHT: 68 IN | WEIGHT: 154 LBS | SYSTOLIC BLOOD PRESSURE: 136 MMHG | HEART RATE: 68 BPM | DIASTOLIC BLOOD PRESSURE: 78 MMHG | BODY MASS INDEX: 23.34 KG/M2

## 2024-03-19 DIAGNOSIS — R55 SYNCOPE AND COLLAPSE: Primary | ICD-10-CM

## 2024-03-19 DIAGNOSIS — I49.5 SICK SINUS SYNDROME: ICD-10-CM

## 2024-03-19 PROCEDURE — 99213 OFFICE O/P EST LOW 20 MIN: CPT

## 2024-03-19 PROCEDURE — 3075F SYST BP GE 130 - 139MM HG: CPT

## 2024-03-19 PROCEDURE — 3078F DIAST BP <80 MM HG: CPT

## 2024-03-19 RX ORDER — EZETIMIBE 10 MG/1
10 TABLET ORAL DAILY
COMMUNITY
Start: 2023-11-16 | End: 2024-05-14

## 2024-03-19 RX ORDER — PANTOPRAZOLE SODIUM 40 MG/1
40 TABLET, DELAYED RELEASE ORAL DAILY
COMMUNITY
Start: 2024-03-01 | End: 2024-04-30

## 2024-03-20 PROCEDURE — 93000 ELECTROCARDIOGRAM COMPLETE: CPT

## 2024-03-20 NOTE — PROGRESS NOTES
Date of Office Visit: 2024  Encounter Provider: WILLIAM Schafer  Place of Service: [facility] CARDIOLOGY  Patient Name: Anabell Damian  :1945    Chief Complaint   Patient presents with    Syncope   :     HPI: Anabell Damian is a 78 y.o. female who follows with Dr. Meehan. She has history of syncope going back to .     She had her first episode of syncope around . Says she was told vasovagal.     She did well for years but  she had prolonged episode with nausea/vomiting and syncope. She had loop placed at [facility].     She was noted to have some pauses on loop, all occurring during night time hours.     She had another episode of syncope last month and presented to ED. She was noted to have several pauses on her loop during that time, longest was 30 seconds.             She presents today to discuss recent pauses and consider pacemaker.     She tells me she has had syncope for years.     Usually occurs and she does well for a few years and then has more syncope.     When syncope occurs she is always nauseous after and sometimes vomits. Says she usually feels bad for about 24 hours after.     Recent episode of syncope was associated with several pauses on her loop. Two longest were 30 seconds. All occurring on night of  and .     She was aware of these episodes, all occurred while she was at home awake.     EKG today shows NSR.      Past Medical History:   Diagnosis Date    Adenomatous polyp of colon 2020    Agatston CAC score, >400     429 in : LM 43, , Cx 82, RCA 28    Benign paroxysmal positional vertigo due to bilateral vestibular disorder 2017    Encounter for screening for malignant neoplasm of colon 2020    GERD without esophagitis 2022    History of colon polyps 2023    History of loop recorder 2023    Hypertension     Hypothyroidism     PONV (postoperative nausea and vomiting)     Syncope and collapse  2017       Past Surgical History:   Procedure Laterality Date    BREAST CYST ASPIRATION      COLONOSCOPY  2006    COLONOSCOPY N/A 10/18/2017    Procedure: COLONOSCOPY into cecum with cold biopsy polypectomy and hot snare polypectomies;  Surgeon: Rosanne Borden MD;  Location: [facility] ENDOSCOPY;  Service:     COLONOSCOPY N/A 2020    Procedure: COLONOSCOPY into cecum with cold biopsy polypectomies;  Surgeon: Rosanne Borden MD;  Location:  [facility] ENDOSCOPY;  Service: Gastroenterology;  Laterality: N/A;  Pre op: History of Polyps  Post op: Diverticulosis, Polyps    COLONOSCOPY N/A 2023    Procedure: COLONOSCOPY to the cecum with cold snare polypectomy;  Surgeon: Rosanne Borden MD;  Location:  [facility] ENDOSCOPY;  Service: Gastroenterology;  Laterality: N/A;  preop-hx of polyps  postop-diverticulosis; hemorrhoids; polyp    HEMORRHOIDECTOMY      OTHER SURGICAL HISTORY      rectocele repair    TUBAL ABDOMINAL LIGATION         Social History     Socioeconomic History    Marital status:    Tobacco Use    Smoking status: Former     Current packs/day: 0.00     Types: Cigarettes     Quit date:      Years since quittin.2    Smokeless tobacco: Never   Vaping Use    Vaping status: Never Used   Substance and Sexual Activity    Alcohol use: Yes     Comment: 4-5 drinks per week    Drug use: No    Sexual activity: Defer       Family History   Problem Relation Age of Onset    Colon cancer Paternal Aunt     Breast cancer Neg Hx     Malig Hyperthermia Neg Hx        Review of Systems   Constitutional: Negative for chills, fever and malaise/fatigue.   Cardiovascular:  Positive for syncope. Negative for chest pain, dyspnea on exertion, leg swelling, near-syncope, orthopnea, palpitations and paroxysmal nocturnal dyspnea.   Respiratory:  Negative for cough and shortness of breath.    Hematologic/Lymphatic: Negative.    Musculoskeletal:  Negative for joint pain, joint swelling and myalgias.  "  Gastrointestinal:  Negative for abdominal pain, diarrhea, melena, nausea and vomiting.   Genitourinary:  Negative for frequency and hematuria.   Neurological:  Negative for light-headedness, numbness, paresthesias and seizures.   Allergic/Immunologic: Negative.    All other systems reviewed and are negative.      Allergies   Allergen Reactions    Cefdinir Hives    Atorvastatin Other (See Comments)     LIGHTHEADED    Clindamycin Other (See Comments)    Hydrocodone     Levofloxacin      Tendon pain in left shoulder     Lisinopril      cough    Penicillins          Current Outpatient Medications:     BIOTIN PO, Take  by mouth., Disp: , Rfl:     cetirizine (zyrTEC) 10 MG tablet, Take 1 tablet by mouth Daily., Disp: , Rfl:     Cholecalciferol (VITAMIN D3) 75 MCG (3000 UT) tablet, Take 1 tablet by mouth Daily., Disp: , Rfl:     CRANBERRY PO, Take 1,680 mg by mouth Daily., Disp: , Rfl:     cyanocobalamin (VITAMIN B-12) 500 MCG tablet, Take 1 tablet by mouth Daily., Disp: , Rfl:     ezetimibe (ZETIA) 10 MG tablet, Take 1 tablet by mouth Daily., Disp: , Rfl:     GARLIC PO, Take  by mouth., Disp: , Rfl:     levothyroxine (SYNTHROID, LEVOTHROID) 88 MCG tablet, 1 tablet Daily., Disp: , Rfl:     Omega-3 Fatty Acids (FISH OIL) 1000 MG capsule capsule, Take  by mouth Daily., Disp: , Rfl:     ondansetron ODT (ZOFRAN-ODT) 4 MG disintegrating tablet, Place 1 tablet on the tongue Every 6 (Six) Hours As Needed for Nausea or Vomiting., Disp: 15 tablet, Rfl: 0    pantoprazole (PROTONIX) 40 MG EC tablet, Take 1 tablet by mouth Daily., Disp: , Rfl:     spironolactone (ALDACTONE) 25 MG tablet, Take 0.5 tablets by mouth Daily., Disp: , Rfl:     vitamin B-6 (PYRIDOXINE) 100 MG tablet, Take 1 tablet by mouth Daily., Disp: , Rfl:     vitamin C (ASCORBIC ACID) 500 MG tablet, Take 8 tablets by mouth Daily., Disp: , Rfl:       Objective:     Vitals:    03/19/24 1341   BP: 136/78   Pulse: 68   Weight: 69.9 kg (154 lb)   Height: 172.7 cm (68\") "     Body mass index is 23.42 kg/m².    PHYSICAL EXAM:    Vitals Reviewed.   General Appearance: No acute distress, well developed and well nourished.   Eyes: Conjunctiva and lids: No erythema, swelling, or discharge. Sclera non-icteric.   HENT: Atraumatic, normocephalic. External eyes, ears, and nose normal.   Respiratory: No signs of respiratory distress. Respiration rhythm and depth normal.   Clear to auscultation. No rales, crackles, rhonchi, or wheezing auscultated.   Cardiovascular:  Heart Rate and Rhythm: Normal, Heart Sounds: Normal S1 and S2. No S3 or S4 noted.  Gastrointestinal:  Abdomen soft, non-distended, non-tender.   Musculoskeletal: Normal movement of extremities  Skin: Warm and dry.   Psychiatric: Patient alert and oriented to person, place, and time. Speech and behavior appropriate. Normal mood and affect.       ECG 12 Lead    Date/Time: 3/20/2024 11:31 AM  Performed by: Rohan Workman APRN    Authorized by: Rohan Workman APRN  Comparison: compared with previous ECG   Similar to previous ECG  Rhythm: sinus rhythm            Assessment:       Diagnosis Plan   1. Syncope and collapse               Plan:   Syncope collapse---- she has vasovagal syncope with associated pauses on her loop recorder. These recent episodes occurred during day time hours while she was awake. Given the duration of her pauses and symptoms it would be reasonable to discuss pacemaker placement.     We discussed the possibility that she would still have syncope if there is a hypotension component but that patients undergoing pacemaker for vagal syncope have been shown to have less episodes.           After discussion of risks, benefits, and alternatives. She would like to proceed with pacemaker placement.         As always, it has been a pleasure to participate in your patient's care.      Sincerely,         WILLIAM Schafer

## 2024-03-22 PROBLEM — I49.5 SICK SINUS SYNDROME: Status: ACTIVE | Noted: 2024-03-19

## 2024-04-02 ENCOUNTER — TRANSCRIBE ORDERS (OUTPATIENT)
Dept: CARDIOLOGY | Facility: CLINIC | Age: 79
End: 2024-04-02
Payer: MEDICARE

## 2024-04-02 DIAGNOSIS — Z01.810 PRE-OPERATIVE CARDIOVASCULAR EXAMINATION: Primary | ICD-10-CM

## 2024-04-02 DIAGNOSIS — Z13.6 SCREENING FOR ISCHEMIC HEART DISEASE: ICD-10-CM

## 2024-04-19 ENCOUNTER — TELEPHONE (OUTPATIENT)
Age: 79
End: 2024-04-19
Payer: MEDICARE

## 2024-04-19 NOTE — TELEPHONE ENCOUNTER
Omaira, if she has any syncopal episodes prior to PPM implantation, please ask her to go to the ED.    Rodney miller

## 2024-04-19 NOTE — TELEPHONE ENCOUNTER
The pt, who has hx of pause events and is planned to get pacemaker in May, had a new remote report come from her LOOP recorder. There are 16 new pauses on her device and 3 strips to view of pause events lasting 3, 24, and 29 seconds that occur on 4/18 at 02:37am-05:11am. Pauses appear to be true pauses - strip below of longest pause. I tried to call the pt and check on her but her phone goes directly to  - I left a  for her to call clinic to discuss. Her presenting rhythm on the report shows SR @ 60s bpm.    Pause Event 4/18 02:37

## 2024-04-19 NOTE — TELEPHONE ENCOUNTER
Thanks, I tried to reach out to her and her spouse multiple times but can only get voicemail. VM was left x 2.   Kindly,   Megan Schotanus Device RN

## 2024-05-01 ENCOUNTER — TELEPHONE (OUTPATIENT)
Dept: CARDIOLOGY | Facility: CLINIC | Age: 79
End: 2024-05-01
Payer: MEDICARE

## 2024-05-01 NOTE — TELEPHONE ENCOUNTER
Caller: Anabell Damian    Relationship: Self    Best call back number: [phone]    What is the best time to reach you: ANYTIME    Who are you requesting to speak with (clinical staff, provider,  specific staff member): CLINICAL      What was the call regarding: PT HAS BEEN OUT OF COUNTRY AND HAD 1 EPISODE WITH HER LOOP RECORDER ON AIR PLANE AND HAD MESSAGES FROM OUR OFFICE WHEN SHE RETURNED HOME.   SHE ALSO HAS  QUESTIONS ABOUT UPCOMING PACEMAKER PROCEDURE. PLEASE CALL TO DISCUSS WITH PT

## 2024-05-06 ENCOUNTER — LAB (OUTPATIENT)
Facility: HOSPITAL | Age: 79
End: 2024-05-06
Payer: MEDICARE

## 2024-05-06 DIAGNOSIS — Z13.6 SCREENING FOR ISCHEMIC HEART DISEASE: ICD-10-CM

## 2024-05-06 DIAGNOSIS — Z01.810 PRE-OPERATIVE CARDIOVASCULAR EXAMINATION: ICD-10-CM

## 2024-05-06 LAB
ANION GAP SERPL CALCULATED.3IONS-SCNC: 13 MMOL/L (ref 5–15)
BASOPHILS # BLD AUTO: 0.02 10*3/MM3 (ref 0–0.2)
BASOPHILS NFR BLD AUTO: 0.3 % (ref 0–1.5)
BUN SERPL-MCNC: 17 MG/DL (ref 8–23)
BUN/CREAT SERPL: 19.8 (ref 7–25)
CALCIUM SPEC-SCNC: 9.9 MG/DL (ref 8.6–10.5)
CHLORIDE SERPL-SCNC: 104 MMOL/L (ref 98–107)
CO2 SERPL-SCNC: 23 MMOL/L (ref 22–29)
CREAT SERPL-MCNC: 0.86 MG/DL (ref 0.57–1)
DEPRECATED RDW RBC AUTO: 43.2 FL (ref 37–54)
EGFRCR SERPLBLD CKD-EPI 2021: 68.4 ML/MIN/1.73
EOSINOPHIL # BLD AUTO: 0.19 10*3/MM3 (ref 0–0.4)
EOSINOPHIL NFR BLD AUTO: 2.5 % (ref 0.3–6.2)
ERYTHROCYTE [DISTWIDTH] IN BLOOD BY AUTOMATED COUNT: 13 % (ref 12.3–15.4)
GLUCOSE SERPL-MCNC: 104 MG/DL (ref 65–99)
HCT VFR BLD AUTO: 41.2 % (ref 34–46.6)
HGB BLD-MCNC: 12.8 G/DL (ref 12–15.9)
IMM GRANULOCYTES # BLD AUTO: 0.03 10*3/MM3 (ref 0–0.05)
IMM GRANULOCYTES NFR BLD AUTO: 0.4 % (ref 0–0.5)
LYMPHOCYTES # BLD AUTO: 1.62 10*3/MM3 (ref 0.7–3.1)
LYMPHOCYTES NFR BLD AUTO: 21.4 % (ref 19.6–45.3)
MCH RBC QN AUTO: 28.4 PG (ref 26.6–33)
MCHC RBC AUTO-ENTMCNC: 31.1 G/DL (ref 31.5–35.7)
MCV RBC AUTO: 91.6 FL (ref 79–97)
MONOCYTES # BLD AUTO: 0.39 10*3/MM3 (ref 0.1–0.9)
MONOCYTES NFR BLD AUTO: 5.1 % (ref 5–12)
NEUTROPHILS NFR BLD AUTO: 5.33 10*3/MM3 (ref 1.7–7)
NEUTROPHILS NFR BLD AUTO: 70.3 % (ref 42.7–76)
NRBC BLD AUTO-RTO: 0 /100 WBC (ref 0–0.2)
PLATELET # BLD AUTO: 356 10*3/MM3 (ref 140–450)
PMV BLD AUTO: 10.3 FL (ref 6–12)
POTASSIUM SERPL-SCNC: 4.3 MMOL/L (ref 3.5–5.2)
RBC # BLD AUTO: 4.5 10*6/MM3 (ref 3.77–5.28)
SODIUM SERPL-SCNC: 140 MMOL/L (ref 136–145)
WBC NRBC COR # BLD AUTO: 7.58 10*3/MM3 (ref 3.4–10.8)

## 2024-05-06 PROCEDURE — 36415 COLL VENOUS BLD VENIPUNCTURE: CPT

## 2024-05-06 PROCEDURE — 85025 COMPLETE CBC W/AUTO DIFF WBC: CPT

## 2024-05-06 PROCEDURE — 80048 BASIC METABOLIC PNL TOTAL CA: CPT

## 2024-05-08 ENCOUNTER — APPOINTMENT (OUTPATIENT)
Dept: GENERAL RADIOLOGY | Facility: HOSPITAL | Age: 79
End: 2024-05-08
Payer: MEDICARE

## 2024-05-08 ENCOUNTER — HOSPITAL ENCOUNTER (OUTPATIENT)
Facility: HOSPITAL | Age: 79
Setting detail: HOSPITAL OUTPATIENT SURGERY
Discharge: HOME | End: 2024-05-08
Attending: INTERNAL MEDICINE | Admitting: INTERNAL MEDICINE
Payer: MEDICARE

## 2024-05-08 VITALS
SYSTOLIC BLOOD PRESSURE: 167 MMHG | BODY MASS INDEX: 22.13 KG/M2 | HEIGHT: 68 IN | OXYGEN SATURATION: 99 % | TEMPERATURE: 97.6 F | RESPIRATION RATE: 18 BRPM | WEIGHT: 146 LBS | HEART RATE: 61 BPM | DIASTOLIC BLOOD PRESSURE: 71 MMHG

## 2024-05-08 DIAGNOSIS — R55 SYNCOPE AND COLLAPSE: ICD-10-CM

## 2024-05-08 DIAGNOSIS — I49.5 SICK SINUS SYNDROME: ICD-10-CM

## 2024-05-08 LAB
QT INTERVAL: 439 MS
QTC INTERVAL: 446 MS

## 2024-05-08 PROCEDURE — C1893 INTRO/SHEATH, FIXED,NON-PEEL: HCPCS | Performed by: INTERNAL MEDICINE

## 2024-05-08 PROCEDURE — C1898 LEAD, PMKR, OTHER THAN TRANS: HCPCS | Performed by: INTERNAL MEDICINE

## 2024-05-08 PROCEDURE — 33208 INSRT HEART PM ATRIAL & VENT: CPT | Performed by: INTERNAL MEDICINE

## 2024-05-08 PROCEDURE — 25010000002 ONDANSETRON PER 1 MG: Performed by: INTERNAL MEDICINE

## 2024-05-08 PROCEDURE — 71045 X-RAY EXAM CHEST 1 VIEW: CPT

## 2024-05-08 PROCEDURE — 25010000002 CEFAZOLIN PER 500 MG: Performed by: INTERNAL MEDICINE

## 2024-05-08 PROCEDURE — 25010000002 LIDOCAINE 1 % SOLUTION: Performed by: INTERNAL MEDICINE

## 2024-05-08 PROCEDURE — 25510000001 IOPAMIDOL PER 1 ML: Performed by: INTERNAL MEDICINE

## 2024-05-08 PROCEDURE — 33286 RMVL SUBQ CAR RHYTHM MNTR: CPT | Performed by: INTERNAL MEDICINE

## 2024-05-08 PROCEDURE — 93005 ELECTROCARDIOGRAM TRACING: CPT | Performed by: INTERNAL MEDICINE

## 2024-05-08 PROCEDURE — 93010 ELECTROCARDIOGRAM REPORT: CPT | Performed by: INTERNAL MEDICINE

## 2024-05-08 PROCEDURE — 25010000002 FENTANYL CITRATE (PF) 50 MCG/ML SOLUTION: Performed by: INTERNAL MEDICINE

## 2024-05-08 PROCEDURE — C1894 INTRO/SHEATH, NON-LASER: HCPCS | Performed by: INTERNAL MEDICINE

## 2024-05-08 PROCEDURE — 25810000003 SODIUM CHLORIDE 0.9 % SOLUTION: Performed by: INTERNAL MEDICINE

## 2024-05-08 PROCEDURE — 25010000002 MIDAZOLAM PER 1 MG: Performed by: INTERNAL MEDICINE

## 2024-05-08 PROCEDURE — C1785 PMKR, DUAL, RATE-RESP: HCPCS | Performed by: INTERNAL MEDICINE

## 2024-05-08 PROCEDURE — S0260 H&P FOR SURGERY: HCPCS | Performed by: INTERNAL MEDICINE

## 2024-05-08 DEVICE — LD PM SOLIA S 60: Type: IMPLANTABLE DEVICE | Status: FUNCTIONAL

## 2024-05-08 DEVICE — IMPLANTABLE DEVICE
Type: IMPLANTABLE DEVICE | Status: FUNCTIONAL
Brand: EDORA 8 DR-T

## 2024-05-08 DEVICE — LD PM SOLIA S 53: Type: IMPLANTABLE DEVICE | Status: FUNCTIONAL

## 2024-05-08 RX ORDER — ACETAMINOPHEN 650 MG/1
650 SUPPOSITORY RECTAL EVERY 4 HOURS PRN
Status: DISCONTINUED | OUTPATIENT
Start: 2024-05-08 | End: 2024-05-08 | Stop reason: HOSPADM

## 2024-05-08 RX ORDER — MIDAZOLAM HYDROCHLORIDE 1 MG/ML
INJECTION INTRAMUSCULAR; INTRAVENOUS
Status: DISCONTINUED | OUTPATIENT
Start: 2024-05-08 | End: 2024-05-08 | Stop reason: HOSPADM

## 2024-05-08 RX ORDER — SODIUM CHLORIDE 9 MG/ML
40 INJECTION, SOLUTION INTRAVENOUS AS NEEDED
Status: DISCONTINUED | OUTPATIENT
Start: 2024-05-08 | End: 2024-05-08 | Stop reason: HOSPADM

## 2024-05-08 RX ORDER — ACETAMINOPHEN 325 MG/1
650 TABLET ORAL EVERY 4 HOURS PRN
Status: DISCONTINUED | OUTPATIENT
Start: 2024-05-08 | End: 2024-05-08 | Stop reason: HOSPADM

## 2024-05-08 RX ORDER — FENTANYL CITRATE 50 UG/ML
INJECTION, SOLUTION INTRAMUSCULAR; INTRAVENOUS
Status: DISCONTINUED | OUTPATIENT
Start: 2024-05-08 | End: 2024-05-08 | Stop reason: HOSPADM

## 2024-05-08 RX ORDER — SODIUM CHLORIDE 9 MG/ML
75 INJECTION, SOLUTION INTRAVENOUS CONTINUOUS
Status: DISCONTINUED | OUTPATIENT
Start: 2024-05-08 | End: 2024-05-08 | Stop reason: HOSPADM

## 2024-05-08 RX ORDER — ONDANSETRON 2 MG/ML
4 INJECTION INTRAMUSCULAR; INTRAVENOUS EVERY 6 HOURS PRN
Status: DISCONTINUED | OUTPATIENT
Start: 2024-05-08 | End: 2024-05-08 | Stop reason: HOSPADM

## 2024-05-08 RX ORDER — METHOHEXITAL IN WATER/PF 100MG/10ML
SYRINGE (ML) INTRAVENOUS
Status: DISCONTINUED | OUTPATIENT
Start: 2024-05-08 | End: 2024-05-08 | Stop reason: HOSPADM

## 2024-05-08 RX ORDER — SODIUM CHLORIDE 0.9 % (FLUSH) 0.9 %
10 SYRINGE (ML) INJECTION AS NEEDED
Status: DISCONTINUED | OUTPATIENT
Start: 2024-05-08 | End: 2024-05-08 | Stop reason: HOSPADM

## 2024-05-08 RX ORDER — LIDOCAINE HYDROCHLORIDE 10 MG/ML
INJECTION, SOLUTION INFILTRATION; PERINEURAL
Status: DISCONTINUED | OUTPATIENT
Start: 2024-05-08 | End: 2024-05-08 | Stop reason: HOSPADM

## 2024-05-08 RX ORDER — ONDANSETRON 2 MG/ML
INJECTION INTRAMUSCULAR; INTRAVENOUS
Status: DISCONTINUED | OUTPATIENT
Start: 2024-05-08 | End: 2024-05-08 | Stop reason: HOSPADM

## 2024-05-08 RX ORDER — PANTOPRAZOLE SODIUM 20 MG/1
40 TABLET, DELAYED RELEASE ORAL DAILY
COMMUNITY

## 2024-05-08 RX ORDER — NITROGLYCERIN 0.4 MG/1
0.4 TABLET SUBLINGUAL
Status: DISCONTINUED | OUTPATIENT
Start: 2024-05-08 | End: 2024-05-08 | Stop reason: HOSPADM

## 2024-05-08 RX ORDER — SODIUM CHLORIDE 0.9 % (FLUSH) 0.9 %
3 SYRINGE (ML) INJECTION EVERY 12 HOURS SCHEDULED
Status: DISCONTINUED | OUTPATIENT
Start: 2024-05-08 | End: 2024-05-08 | Stop reason: HOSPADM

## 2024-05-08 RX ADMIN — SODIUM CHLORIDE 75 ML/HR: 9 INJECTION, SOLUTION INTRAVENOUS at 10:27

## 2024-05-08 NOTE — Clinical Note
Spoke with evon Chatman regarding pt allergy to cefdinir and penicillin, because order for cefazolin is cross-linking to those allergies.  Per pharmacist, cefazolin safe to give, benefits outweigh risks.

## 2024-05-08 NOTE — DISCHARGE INSTRUCTIONS
"[facility]   [phone]    Post Pacemaker / Defibrillator Implant Instructions      1.  The dressing may be removed the next day.    2. If steri-strips were used, they should not be removed. Allow them to \"fall off\".      3. You may shower after the dressing is removed. Do not allow shower water to hit directly on incision.    4. No lotion/powder/ointment/cream on incision until it is healed.    5. Gently wash incision daily with soap and water and pat dry.    6. You may reapply a dressing if there is drainage, otherwise leave your incision open to air. If you reapply a dressing, please notify the pacemaker clinic.    7. No heavy lifting, pulling, or pushing.    8. Do not raise the affected arm over your head for a minimum of 1 month.    9. The pacemaker clinic will contact you (usually within 1 business day) to schedule a pacemaker/incision check. The check is usually done 7-10 days post-implant. If you have not heard from the pacemaker clinic within 3 days, please call the office.    10.  No driving until seen at your follow up appointment    11. Please call the office if you experience any of the following:   bleeding or drainage from your incision   swelling, redness, or opening of your incision   fever or chills   pain not relieved with medication   chest pain or difficulty breathing   lightheadness    12. For defibrillator patients only: If you receive a shock from your device, please call the office. If you receive 2 or more shocks within a 24 hour period OR if you receive 1 shock and feel poorly, you should be evaluated in the emergency room. Please DO NOT DRIVE if you have received a shock until your device has been checked.  "

## 2024-05-08 NOTE — Clinical Note
using micropuncture technique with ultrasound guidance into the left subclavian vein. Sheath insertion delayed.

## 2024-05-08 NOTE — H&P
[facility]   HISTORY AND PHYSICAL    Patient Name:Anabell Damian  : 1945  MRN: [id number]  Primary Care Physician: Jennifer Lauren MD  Date of admission: 2024    Subjective   Subjective     Chief Complaint:   Syncope    History of Present Illness    Anabell Damian is a 78 y.o. female who follows with Dr. Meehan. She has history of syncope going back to .      She had her first episode of syncope around . Says she was told vasovagal.      She did well for years but  she had prolonged episode with nausea/vomiting and syncope. She had loop placed at [facility].      She was noted to have some pauses on loop, all occurring during night time hours.      She had another episode of syncope last month and presented to ED. She was noted to have several pauses on her loop during that time, longest was 30 seconds.                  She presents today to discuss recent pauses and consider pacemaker.      She tells me she has had syncope for years.      Usually occurs and she does well for a few years and then has more syncope.      When syncope occurs she is always nauseous after and sometimes vomits. Says she usually feels bad for about 24 hours after.      Recent episode of syncope was associated with several pauses on her loop. Two longest were 30 seconds. All occurring on night of  and .      She was aware of these episodes, all occurred while she was at home awake.      EKG today shows NSR.         Review of Systems   Constitutional:  Negative for activity change and fatigue.   Eyes: Negative.    Respiratory:  Negative for chest tightness and shortness of breath.    Cardiovascular:  Negative for chest pain, palpitations and leg swelling.   Gastrointestinal: Negative.    Endocrine: Negative.    Genitourinary: Negative.    Musculoskeletal: Negative.    Skin: Negative.    Neurological:  Negative for dizziness and syncope.   Hematological: Negative.    Psychiatric/Behavioral: Negative.            Personal History     Past Medical History:   Diagnosis Date    Adenomatous polyp of colon 09/02/2020    Agatston CAC score, >400     429 in 2021: LM 43, , Cx 82, RCA 28    Benign paroxysmal positional vertigo due to bilateral vestibular disorder 08/30/2017    Encounter for screening for malignant neoplasm of colon 09/02/2020    GERD without esophagitis 11/16/2022    History of colon polyps 03/22/2023    History of loop recorder 06/26/2023    Hypertension     Hypothyroidism     PONV (postoperative nausea and vomiting)     Syncope and collapse 08/30/2017       Past Surgical History:   Procedure Laterality Date    BREAST CYST ASPIRATION      COLONOSCOPY  06/21/2006    COLONOSCOPY N/A 10/18/2017    Procedure: COLONOSCOPY into cecum with cold biopsy polypectomy and hot snare polypectomies;  Surgeon: Rosanne Borden MD;  Location:  [facility] ENDOSCOPY;  Service:     COLONOSCOPY N/A 9/22/2020    Procedure: COLONOSCOPY into cecum with cold biopsy polypectomies;  Surgeon: Rosanne Borden MD;  Location: [facility] ENDOSCOPY;  Service: Gastroenterology;  Laterality: N/A;  Pre op: History of Polyps  Post op: Diverticulosis, Polyps    COLONOSCOPY N/A 9/12/2023    Procedure: COLONOSCOPY to the cecum with cold snare polypectomy;  Surgeon: Rosanne Borden MD;  Location: [facility] ENDOSCOPY;  Service: Gastroenterology;  Laterality: N/A;  preop-hx of polyps  postop-diverticulosis; hemorrhoids; polyp    HEMORRHOIDECTOMY      OTHER SURGICAL HISTORY      rectocele repair    TUBAL ABDOMINAL LIGATION  1982       Family History: Her family history includes Colon cancer in her paternal aunt.     Social History: She  reports that she quit smoking about 41 years ago. Her smoking use included cigarettes. She has never used smokeless tobacco. She reports current alcohol use. She reports that she does not use drugs.    Home Medications:  Biotin, Cranberry, Garlic, Vitamin D3, cetirizine, cyanocobalamin, ezetimibe, fish oil,  levothyroxine, ondansetron ODT, pantoprazole, spironolactone, vitamin B-6, and vitamin C    Allergies:  She is allergic to cefdinir, atorvastatin, clindamycin, hydrocodone, levofloxacin, lisinopril, and penicillins.    Objective    Objective     Vitals:    Temp:  [97.6 °F (36.4 °C)] 97.6 °F (36.4 °C)  Heart Rate:  [65] 65  Resp:  [18] 18  BP: (171)/(93) 171/93    Physical Exam  Vitals and nursing note reviewed.   Constitutional:       General: She is not in acute distress.     Appearance: She is not diaphoretic.   HENT:      Mouth/Throat:      Pharynx: No oropharyngeal exudate.   Eyes:      General: No scleral icterus.  Neck:      Trachea: No tracheal deviation.   Cardiovascular:      Rate and Rhythm: Normal rate and regular rhythm.   Pulmonary:      Effort: Pulmonary effort is normal. No respiratory distress.      Breath sounds: Normal breath sounds.   Abdominal:      General: There is no distension.      Palpations: Abdomen is soft.   Skin:     General: Skin is warm and dry.   Neurological:      Mental Status: She is alert and oriented to person, place, and time.   Psychiatric:         Behavior: Behavior normal.         Thought Content: Thought content normal.         Judgment: Judgment normal.          Result Review    Result Review:  I have personally reviewed the results from the time of this admission to 5/8/2024 11:08 EDT and agree with these findings:  []  Laboratory list / accordion  []  Microbiology  []  Radiology  []  EKG/Telemetry   []  Cardiology/Vascular   []  Pathology  []  Old records  []  Other:  Most notable findings include: sinus rhythm      Assessment & Plan   Assessment / Plan     Brief Patient Summary:  Anabell Damian is a 78 y.o. female with recurrent syncope and significant pauses    Active Hospital Problems:  Active Hospital Problems    Diagnosis     **Sick sinus syndrome     Syncope and collapse      Plan:   Proceed with pacemaker placement        Ananda Morton MD

## 2024-05-08 NOTE — Clinical Note
Encounter Date: 3/18/2023       History     Chief Complaint   Patient presents with    Sore Throat     Oral swelling and pain, pain with swallowing      4:27 PM  Patient is 23-year-old male with a history of eczema, on methotrexate, who presents to [facility] ED with sore throat.    Patient had symptoms of a cold onset 2 weeks ago.  He states his cough and congestion improved.  He continues to have a sore throat.  He states 3 days ago he noted swelling to his left neck.  He has noted a voice change.  He has difficulty eating but is able to tolerate food.  He has worsening pain with swallowing.  Denies fever but has had chills.  His current pain is 7/10. He confirms that he cannot open his mouth as wide as normal.  Last had ibuprofen at 1300.  [facility] and [facility] referred to the ED for emergent evaluation.  He had a negative point of care strep and mononucleosis test there.       Review of patient's allergies indicates:   Allergen Reactions    Chickpea     Peanuts [peanut (legumes)]     Shellfish containing products      No past medical history on file.  No past surgical history on file.  No family history on file.     Review of Systems   Constitutional:  Positive for chills. Negative for fever.   HENT:  Positive for sore throat and trouble swallowing (able to swallow, but with pain). Negative for congestion (resolve).    Respiratory:  Negative for cough (resolve) and shortness of breath.    Cardiovascular:  Negative for chest pain.   Gastrointestinal:  Negative for nausea.   Genitourinary:  Negative for dysuria.   Musculoskeletal:  Negative for back pain.   Skin:  Negative for rash.   Neurological:  Negative for weakness.   Hematological:  Does not bruise/bleed easily.     Physical Exam     Initial Vitals [03/18/23 1512]   BP Pulse Resp Temp SpO2   (!) 139/94 100 18 98.7 °F (37.1 °C) 100 %      MAP       --         Physical Exam    Vitals reviewed.  Constitutional: He appears well-developed  Sheath peeled away. and well-nourished. He is not diaphoretic. He is cooperative.  Non-toxic appearance. He does not have a sickly appearance. He does not appear ill. No distress.   HENT:   Head: Normocephalic and atraumatic.   Nose: Nose normal.   Mouth/Throat: Mucous membranes are normal. There is trismus (3 finger) in the jaw. Oropharyngeal exudate, posterior oropharyngeal edema and posterior oropharyngeal erythema present.   Uvula deviation to the right.  L tonsil with edema, erythema, and exudate.   No hot potato voice, but patient reports that his voice sounds different to him. Tolerating saliva.    Eyes: Conjunctivae and EOM are normal.   Neck:   Normal range of motion.  Pulmonary/Chest: No accessory muscle usage. No tachypnea. No respiratory distress.   Abdominal: He exhibits no distension.   Musculoskeletal:         General: Normal range of motion.      Cervical back: Normal range of motion.     Neurological: He is alert. He has normal strength.   Skin: Skin is dry. No pallor.       ED Course   Procedures  Labs Reviewed   CBC W/ AUTO DIFFERENTIAL - Abnormal; Notable for the following components:       Result Value    MPV 9.0 (*)     Gran # (ANC) 8.8 (*)     Mono # 1.1 (*)     All other components within normal limits    Narrative:     Release to patient->Immediate   GROUP A STREP, MOLECULAR   HIV 1 / 2 ANTIBODY    Narrative:     Release to patient->Immediate   HEPATITIS C ANTIBODY    Narrative:     Release to patient->Immediate   COMPREHENSIVE METABOLIC PANEL    Narrative:     Release to patient->Immediate   HETEROPHILE AB SCREEN    Narrative:     Release to patient->Immediate   SARS-COV-2 RNA AMPLIFICATION, QUAL          Imaging Results               CT Soft Tissue Neck With Contrast (Final result)  Result time 03/18/23 19:49:04      Final result by Marco Antonio Reaves MD (03/18/23 19:49:04)                   Impression:      Left peritonsillar abscess measuring approximately 2.6 cm associated rightward airway displacement and mild  narrowing.    This report was flagged in Epic as abnormal.    Electronically signed by resident: Husam Aguilar  Date:    03/18/2023  Time:    19:12    Electronically signed by: Marco Antonio Reaves MD  Date:    03/18/2023  Time:    19:49               Narrative:    EXAMINATION:  CT SOFT TISSUE NECK WITH CONTRAST    CLINICAL HISTORY:  Neck abscess, deep tissue;    TECHNIQUE:  Low dose axial images as well as sagittal and coronal reconstructions were performed from the skull base to the clavicles following the intravenous administration of 75 mL of Omnipaque 350.    COMPARISON:  None    FINDINGS:  Skull Base and Brain (limited evaluation): No significant abnormality.    Sinuses and Mastoid Air Cells: Right maxillary sinus hypoplasia and mucosal thickening.    Salivary Glands: Parotid and submandibular glands are bilaterally symmetric and demonstrate no gross abnormalities.    Thyroid: Normal in size and configuration the.    Cervical Lymph Nodes: Bilateral enlarged cervical lymph nodes, largest within left cervical group 2 measuring 1.3 cm in short axis (2:98), likely reactive.    Pharynx/Larynx: There is edematous appearance of the oropharyngeal soft tissues.  Left peritonsillar peripheral enhancing fluid collection measuring approximately 2.6 x 1.8 cm.  Mild narrowing the airway at the same level.  The retropharyngeal space is unremarkable    Vasculature (limited evaluation): Vascular structures of the neck appear patent    Upper Airways and Lungs: Trachea is midline and the proximal airways are patent.  Lung apices are clear.    Bones: No acute fracture or suspicious lytic or sclerotic lesion.                                       Medications   dexAMETHasone injection 8 mg (8 mg Intravenous Given 3/18/23 1637)   ketorolac injection 15 mg (15 mg Intravenous Given 3/18/23 1915)   diphenhydrAMINE injection 50 mg (50 mg Intravenous Given 3/18/23 1813)   ampicillin (OMNIPEN) 2 g in sodium chloride 0.9 % 100 mL IVPB (MB+) (0  g Intravenous Stopped 3/18/23 2206)   iohexoL (OMNIPAQUE 350) injection 75 mL (75 mLs Intravenous Given 3/18/23 1848)   LIDOcaine-EPINEPHrine 1%-1:100,000 injection 20 mL (20 mLs Other Given by Provider 3/18/23 2100)     Medical Decision Making:   Initial Assessment:   Patient is 23-year-old male with a history of eczema, on methotrexate, who presents to [facility] ED with sore throat.  Differential Diagnosis:   Includes but is not limited to peritonsillar abscess, tonsillitis, pharyngitis, viral syndrome.  Clinical Tests:   Lab Tests: Reviewed and Ordered  Radiological Study: Ordered and Reviewed  ED Management:  Will initiate workup, give IV Decadron, and reassess.  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 Recent literature shows that it is safe to administer IV contrast in these patients.  Will discuss case with Radiology.  Other:   I have discussed this case with another health care provider.           ED Course as of 03/18/23 2229   Sat Mar 18, 2023   1615 BP(!): 139/94 [CL]   1615 Temp: 98.7 °F (37.1 °C) [CL]   1615 Pulse: 100 [CL]   1615 Resp: 18 [CL]   1615 SpO2: 100 % [CL]   1708 WBC: 12.22 [CL]   1708 Immature Granulocytes: 0.2 [CL]   1725 Sodium: 138 [CL]   1725 Potassium: 3.9 [CL]   1725 Creatinine: 0.9 [CL]   1725 BILIRUBIN TOTAL: 0.4 [CL]   1725 AST: 29 [CL]   1725 ALT: 27 [CL]   1730 Very limited data suggest that patient's with shellfish allergy actually have an iodine allergy.  It is thought to be more toward tropomyosin protein in shellfish.  Case was discussed with Radiology on-call who also agree and okay for IV contrast.  It is also okay to premedicate to air on the side of caution. Patient already given decadon IV for edema. Will give benadryl IV. I discussed the plan of care with patient and his mother via phone who is a rheumatologist, and they are also agreeable.  Will proceed. [CL]   1750 Monospot: Negative [CL]    1750 HIV 1/2 Ag/Ab: Non-reactive [CL]   1750 Hepatitis C Ab: Non-reactive [CL]   1831 SARS-CoV-2 RNA, Amplification, Qual: Negative [CL]   1831 Group A Strep, Molecular: Negative [CL]   1957 CT Soft Tissue Neck With Contrast(!)  2.6 x 1.8 cm L PTA.  [CL]   2013 Case discussed with ENT on-call.  They will evaluate and give recommendations.  We have move patient to an ED bed for likely procedure. [CL]   2013 Patient updated with plan of care.  He reports significant improvement in his pain while here.  No new complaints.  He is agreeable with plan. [CL]      ED Course User Index  [CL] Janneth Lanier PA-C          ENT has evaluated patient and performed incision and drainage.  Refer to their consult note.  They recommend continuing treatment with Augmentin.      Plan of care discussed with patient.  He reports significant improvement in his symptoms.  Vitals remained stable.  His trismus as resolved.  Ibuprofen as needed for pain relief.  Soft/fluid food.  Start Medrol Dosepak if he feels an increase in swelling.  Return promptly to the emergency department if symptoms worsen or if he develops fever or chills despite medication use.  He voices understanding.  Follow-up with ENT as needed.  Follow with PCP.  All of his questions were answered.  Patient comfortable with plan and stable for discharge.     I have reviewed patient's chart and discussed this case with my supervising MD.     Janneth Lanier PA-C  Emergent Department  [facility]  Spectralink #[id number] or #[phone]    Clinical Impression:   Final diagnoses:  [J36] Abscess, peritonsillar (Primary)        ED Disposition Condition    Discharge Stable          ED Prescriptions       Medication Sig Dispense Start Date End Date Auth. Provider    ibuprofen (ADVIL,MOTRIN) 800 MG tablet Take 1 tablet (800 mg total) by mouth every 6 (six) hours as needed. 20 tablet 3/18/2023 -- Janneth Lanier PA-C    amoxicillin-clavulanate 875-125mg (AUGMENTIN) 875-125 mg per tablet  Take 1 tablet by mouth every 12 (twelve) hours. for 14 days 28 tablet 3/18/2023 4/1/2023 Janneth Lanier PA-C    methylPREDNISolone (MEDROL DOSEPACK) 4 mg tablet Take packet as instructed. 21 each 3/18/2023 4/8/2023 Janneth Lanier PA-C          Follow-up Information       Follow up With Specialties Details Why Contact Info Additional Information    [facility] ENT Otolaryngology Schedule an appointment as soon as possible for a visit   [address] [address]  [phone]     [facility] Internal Medicine Schedule an appointment as soon as possible for a visit   [address] [id number] [phone] [facility] Please park in surface lot and check in at central registration desk    [facility] Emergency Medicine  If symptoms worsen [address] [id number] [phone]              Janneth Lanier PA-C  03/18/23 3446

## 2024-05-08 NOTE — Clinical Note
Lead advanced under fluoro to the right atrium. Pt transported to CT with RN and RT, continuous monitoring in place, portable vent.  VSS.  Plan to go directly to MRI after CT is read and pt cleared for MRI.

## 2024-05-14 ENCOUNTER — CLINICAL SUPPORT NO REQUIREMENTS (OUTPATIENT)
Age: 79
End: 2024-05-14
Payer: MEDICARE

## 2024-05-14 DIAGNOSIS — I49.5 SICK SINUS SYNDROME: ICD-10-CM

## 2024-05-14 DIAGNOSIS — R55 SYNCOPE AND COLLAPSE: Primary | ICD-10-CM

## 2024-05-14 DIAGNOSIS — Z95.0 PRESENCE OF CARDIAC PACEMAKER: ICD-10-CM

## 2024-05-14 PROCEDURE — 93280 PM DEVICE PROGR EVAL DUAL: CPT | Performed by: INTERNAL MEDICINE

## 2024-05-29 ENCOUNTER — CLINICAL SUPPORT NO REQUIREMENTS (OUTPATIENT)
Age: 79
End: 2024-05-29
Payer: MEDICARE

## 2024-05-29 DIAGNOSIS — I49.5 SICK SINUS SYNDROME: Primary | ICD-10-CM

## 2024-06-21 NOTE — PROGRESS NOTES
RM:________     PCP: Jennifer Lauren MD    : 1945  AGE: 78 y.o.  EST PATIENT     REASON FOR VISIT/  CC:        BP Readings from Last 3 Encounters:   24 167/71   24 136/78   24 138/67      Wt Readings from Last 3 Encounters:   24 66.2 kg (146 lb)   24 69.9 kg (154 lb)   24 69.4 kg (153 lb)        WT: ____________ BP: __________L __________R HR______    CHEST PAIN: _____________    SOA: _____________PALPS: _______________     LIGHTHEADED: ___________FATIGUE: ________________ EDEMA __________    ALLERGIES:Cefdinir, Atorvastatin, Clindamycin, Hydrocodone, Levofloxacin, Lisinopril, and Penicillins SMOKING HISTORY:  Social History     Tobacco Use    Smoking status: Former     Current packs/day: 0.00     Types: Cigarettes     Quit date:      Years since quittin.4    Smokeless tobacco: Never   Vaping Use    Vaping status: Never Used   Substance Use Topics    Alcohol use: Yes     Comment: 4-5 drinks per week    Drug use: No     CAFFEINE USE_________________  ALCOHOL ______________________

## 2024-06-24 ENCOUNTER — CLINICAL SUPPORT NO REQUIREMENTS (OUTPATIENT)
Age: 79
End: 2024-06-24
Payer: MEDICARE

## 2024-06-24 ENCOUNTER — OFFICE VISIT (OUTPATIENT)
Age: 79
End: 2024-06-24
Payer: MEDICARE

## 2024-06-24 VITALS
WEIGHT: 151 LBS | DIASTOLIC BLOOD PRESSURE: 94 MMHG | BODY MASS INDEX: 22.88 KG/M2 | HEIGHT: 68 IN | HEART RATE: 66 BPM | SYSTOLIC BLOOD PRESSURE: 150 MMHG

## 2024-06-24 DIAGNOSIS — R55 VASOVAGAL SYNCOPE: Primary | ICD-10-CM

## 2024-06-24 DIAGNOSIS — I49.5 SICK SINUS SYNDROME: Primary | ICD-10-CM

## 2024-06-24 PROCEDURE — 99213 OFFICE O/P EST LOW 20 MIN: CPT

## 2024-06-24 PROCEDURE — 3080F DIAST BP >= 90 MM HG: CPT

## 2024-06-24 PROCEDURE — 3077F SYST BP >= 140 MM HG: CPT

## 2024-06-24 PROCEDURE — 93000 ELECTROCARDIOGRAM COMPLETE: CPT

## 2024-06-24 NOTE — PROGRESS NOTES
Date of Office Visit: 2024  Encounter Provider: WILLIAM Schafer  Place of Service: [facility] CARDIOLOGY  Patient Name: Anabell Damian  :1945    Chief Complaint   Patient presents with    SSS    Syncope    Pacemaker Check   :     HPI: Anabell Damian is a 78 y.o. female who follows with Dr. Meehan, referred to Dr. Morton for recurrent syncope.       She had her first episode of syncope around . Says she was told vasovagal.      She did well for years but  she had prolonged episode with nausea/vomiting and syncope. She had loop placed at [facility].      She was noted to have some pauses on loop, all occurring during night time hours.      She had another episode of syncope in 2024 and presented to ED. She was noted to have several pauses on her loop during that time, longest was 30 seconds.     I saw her in the office in 3/20/2024. She had done well since visit to ED. We discussed loop recorder findings. Discussed proceeding with pacemaker for vasovagal syncope to try and reduce episodes, she preferred to proceed.     She had dual chamber Biotronik PPM placed on 2024.                             She presents today for follow up appt.     Since pacemaker placement she has been doing well.     She has not had any further episodes of syncope but her episodes in the past were very far apart.     Normal device testing and function in office today. AP: 26%, RV: 0%. No events on device.     Denies any current symptoms.     She had issues with CLS algorithm and palpitations so we turned it off and she has done better.     She had a lot of questions about her device and vasovagal syncope.     She questions whether she should take pantoprazole or famotidine.     Incision looks great.   Past Medical History:   Diagnosis Date    Adenomatous polyp of colon 2020    Agatston CAC score, >400     429 in : LM 43, , Cx 82, RCA 28    Benign paroxysmal  positional vertigo due to bilateral vestibular disorder 2017    Encounter for screening for malignant neoplasm of colon 2020    GERD without esophagitis 2022    History of colon polyps 2023    History of loop recorder 2023    Hypertension     Hypothyroidism     PONV (postoperative nausea and vomiting)     Syncope and collapse 2017       Past Surgical History:   Procedure Laterality Date    BREAST CYST ASPIRATION      CARDIAC ELECTROPHYSIOLOGY PROCEDURE N/A 2024    Procedure: Pacemaker DC new BIOTRONIK;  Surgeon: Ananda Morton MD;  Location: [facility] CATH INVASIVE LOCATION;  Service: Cardiovascular;  Laterality: N/A;    COLONOSCOPY  2006    COLONOSCOPY N/A 10/18/2017    Procedure: COLONOSCOPY into cecum with cold biopsy polypectomy and hot snare polypectomies;  Surgeon: Rosanne Borden MD;  Location: [facility] ENDOSCOPY;  Service:     COLONOSCOPY N/A 2020    Procedure: COLONOSCOPY into cecum with cold biopsy polypectomies;  Surgeon: Rosanne Borden MD;  Location: [facility] ENDOSCOPY;  Service: Gastroenterology;  Laterality: N/A;  Pre op: History of Polyps  Post op: Diverticulosis, Polyps    COLONOSCOPY N/A 2023    Procedure: COLONOSCOPY to the cecum with cold snare polypectomy;  Surgeon: Rosanne Borden MD;  Location: [facility] ENDOSCOPY;  Service: Gastroenterology;  Laterality: N/A;  preop-hx of polyps  postop-diverticulosis; hemorrhoids; polyp    HEMORRHOIDECTOMY      OTHER SURGICAL HISTORY      rectocele repair    TUBAL ABDOMINAL LIGATION         Social History     Socioeconomic History    Marital status:    Tobacco Use    Smoking status: Former     Current packs/day: 0.00     Types: Cigarettes     Quit date:      Years since quittin.5    Smokeless tobacco: Never   Vaping Use    Vaping status: Never Used   Substance and Sexual Activity    Alcohol use: Yes     Comment: 4-5 drinks per week    Drug use: No    Sexual activity: Defer        Family History   Problem Relation Age of Onset    Colon cancer Paternal Aunt     Breast cancer Neg Hx     Malig Hyperthermia Neg Hx        Review of Systems   Constitutional: Negative for chills, fever and malaise/fatigue.   Cardiovascular:  Negative for chest pain, dyspnea on exertion, leg swelling, near-syncope, orthopnea, palpitations, paroxysmal nocturnal dyspnea and syncope.   Respiratory:  Negative for cough and shortness of breath.    Hematologic/Lymphatic: Negative.    Musculoskeletal:  Negative for joint pain, joint swelling and myalgias.   Gastrointestinal:  Negative for abdominal pain, diarrhea, melena, nausea and vomiting.   Genitourinary:  Negative for frequency and hematuria.   Neurological:  Negative for light-headedness, numbness, paresthesias and seizures.   Allergic/Immunologic: Negative.    All other systems reviewed and are negative.      Allergies   Allergen Reactions    Cefdinir Hives    Atorvastatin Other (See Comments)     LIGHTHEADED    Clindamycin Other (See Comments)    Hydrocodone     Levofloxacin      Tendon pain in left shoulder     Lisinopril      cough    Penicillins          Current Outpatient Medications:     BIOTIN PO, Take  by mouth., Disp: , Rfl:     cetirizine (zyrTEC) 10 MG tablet, Take 1 tablet by mouth Daily., Disp: , Rfl:     Cholecalciferol (VITAMIN D3) 75 MCG (3000 UT) tablet, Take 1 tablet by mouth Daily., Disp: , Rfl:     CRANBERRY PO, Take 1,680 mg by mouth Daily., Disp: , Rfl:     cyanocobalamin (VITAMIN B-12) 500 MCG tablet, Take 1 tablet by mouth Daily., Disp: , Rfl:     GARLIC PO, Take  by mouth., Disp: , Rfl:     levothyroxine (SYNTHROID, LEVOTHROID) 88 MCG tablet, 1 tablet Daily. 6 days a week, Disp: , Rfl:     Omega-3 Fatty Acids (FISH OIL) 1000 MG capsule capsule, Take  by mouth Daily., Disp: , Rfl:     ondansetron ODT (ZOFRAN-ODT) 4 MG disintegrating tablet, Place 1 tablet on the tongue Every 6 (Six) Hours As Needed for Nausea or Vomiting., Disp: 15  "tablet, Rfl: 0    pantoprazole (PROTONIX) 20 MG EC tablet, Take 2 tablets by mouth Daily., Disp: , Rfl:     spironolactone (ALDACTONE) 25 MG tablet, Take 0.5 tablets by mouth Daily., Disp: , Rfl:     vitamin B-6 (PYRIDOXINE) 100 MG tablet, Take 1 tablet by mouth Daily., Disp: , Rfl:     vitamin C (ASCORBIC ACID) 500 MG tablet, Take 8 tablets by mouth Daily., Disp: , Rfl:     ezetimibe (ZETIA) 10 MG tablet, Take 1 tablet by mouth Daily., Disp: , Rfl:       Objective:     Vitals:    06/24/24 1234   BP: 150/94   Pulse: 66   Weight: 68.5 kg (151 lb)   Height: 172.7 cm (68\")     Body mass index is 22.96 kg/m².    PHYSICAL EXAM:    Vitals Reviewed.   General Appearance: No acute distress, well developed and well nourished.   Eyes: Conjunctiva and lids: No erythema, swelling, or discharge. Sclera non-icteric.   HENT: Atraumatic, normocephalic. External eyes, ears, and nose normal.   Respiratory: No signs of respiratory distress. Respiration rhythm and depth normal.   Clear to auscultation. No rales, crackles, rhonchi, or wheezing auscultated.   Cardiovascular:  Heart Rate and Rhythm: Normal, Heart Sounds: Normal S1 and S2. No S3 or S4 noted.  Gastrointestinal:  Abdomen soft, non-distended, non-tender.   Musculoskeletal: Normal movement of extremities  Skin: Warm and dry.   Psychiatric: Patient alert and oriented to person, place, and time. Speech and behavior appropriate. Normal mood and affect.       ECG 12 Lead    Date/Time: 6/24/2024 1:55 PM  Performed by: Rohan Workman APRN    Authorized by: Rohan Workman APRN  Comparison: compared with previous ECG   Similar to previous ECG  Rhythm: sinus rhythm            Assessment:       Diagnosis Plan   1. Vasovagal syncope               Plan:   Vasovagal syncope--- since pacemaker placement she has been doing well. She has not had any further episodes of syncope since device. Denies any current symptoms. AP: 26%, RV: 0%. No events on device. Incision is well healed. "           Follow up in one year with device check.       As always, it has been a pleasure to participate in your patient's care.      Sincerely,         WILLIAM Schafer

## 2024-07-03 ENCOUNTER — OFFICE VISIT (OUTPATIENT)
Dept: CARDIOLOGY | Facility: CLINIC | Age: 79
End: 2024-07-03
Payer: MEDICARE

## 2024-07-03 VITALS
WEIGHT: 149.4 LBS | HEIGHT: 68 IN | HEART RATE: 62 BPM | DIASTOLIC BLOOD PRESSURE: 94 MMHG | SYSTOLIC BLOOD PRESSURE: 136 MMHG | BODY MASS INDEX: 22.64 KG/M2

## 2024-07-03 DIAGNOSIS — I10 ESSENTIAL HYPERTENSION: ICD-10-CM

## 2024-07-03 DIAGNOSIS — R93.1 AGATSTON CAC SCORE, >400: Primary | ICD-10-CM

## 2024-07-03 DIAGNOSIS — R55 VASOVAGAL SYNCOPE: ICD-10-CM

## 2024-07-03 DIAGNOSIS — Z95.0 HISTORY OF PACEMAKER: ICD-10-CM

## 2024-07-03 PROBLEM — Z98.890 HISTORY OF LOOP RECORDER: Status: RESOLVED | Noted: 2023-06-26 | Resolved: 2024-07-03

## 2024-07-03 RX ORDER — PRAVASTATIN SODIUM 10 MG
1 TABLET ORAL NIGHTLY
COMMUNITY
Start: 2024-06-13 | End: 2024-07-13

## 2024-07-03 NOTE — PROGRESS NOTES
Date of Office Visit: 24  Encounter Provider: Kamari Meehan MD  Place of Service: [facility] CARDIOLOGY  Patient Name: Anabell Damian  :1945    Chief Complaint   Patient presents with    Agatston CAC score, >400   :   HPI:     Ms. Damian is 78 y.o. and presents today in follow up. I have reviewed prior notes and there are no changes except for any new updates described below. I have also reviewed any information entered into the medical record by the patient or by ancillary staff.     In , she had a calcium score performed as part of a preventive cardiology evaluation. Her score was 429 (LM 43, , Cx 82, RCA 28).  She had a normal perfusion stress test in 2022.  Her last lipid panel in  showed an HDL of 50 and LDL of 90. She has not tolerated atorvastatin or rosuvastatin; she is on ezetimibe, and Dr Lauren just added low-dose pravastatin.    She has a history of syncope and has had approximately 8 or 9 episodes over the course of her adult life.  The last episode occurred in 2022.  She woke up and had a full bladder and knew that she needed to use the restroom.  However, she then developed bilateral lower extremity charley horses and stood up quickly and passed out.  She then started vomiting profusely and she was on the ground profoundly weak for quite a long time.  She was incontinent of urine as well.  A loop recorder was placed at that time ([facility]).  In , she was noted to have a 5.5-second pause while she was out shopping; she was asymptomatic. However, shed continued to have episodes of syncope associated with pauses, and underwent implantation of a dual chamber Biotronik PPM in May 2024. She had some PMT and her settings had to be adjusted.    She is otherwise quite healthy and active.  She denies any worrisome cardiac symptoms or anything suggestive of angina.    Past Medical History:   Diagnosis Date    Adenomatous  polyp of colon 2020    Agatston CAC score, >400     429 in : LM 43, , Cx 82, RCA 28    Benign paroxysmal positional vertigo due to bilateral vestibular disorder 2017    Encounter for screening for malignant neoplasm of colon 2020    GERD without esophagitis 2022    History of colon polyps 2023    History of loop recorder 2023    removed 2024    History of pacemaker 2024    Hypertension     Hypothyroidism     PONV (postoperative nausea and vomiting)     Syncope and collapse 2017     Past Surgical History:   Procedure Laterality Date    BREAST CYST ASPIRATION      CARDIAC ELECTROPHYSIOLOGY PROCEDURE N/A 2024    Procedure: Pacemaker DC new BIOTRONIK;  Surgeon: Ananda Morton MD;  Location: [facility] CATH INVASIVE LOCATION;  Service: Cardiovascular;  Laterality: N/A;    COLONOSCOPY  2006    COLONOSCOPY N/A 10/18/2017    Procedure: COLONOSCOPY into cecum with cold biopsy polypectomy and hot snare polypectomies;  Surgeon: Rosanne Borden MD;  Location:  [facility];  Service:     COLONOSCOPY N/A 2020    Procedure: COLONOSCOPY into cecum with cold biopsy polypectomies;  Surgeon: Rosanne Borden MD;  Location:  [facility];  Service: Gastroenterology;  Laterality: N/A;  Pre op: History of Polyps  Post op: Diverticulosis, Polyps    COLONOSCOPY N/A 2023    Procedure: COLONOSCOPY to the cecum with cold snare polypectomy;  Surgeon: Rosanne Borden MD;  Location:  [facility];  Service: Gastroenterology;  Laterality: N/A;  preop-hx of polyps  postop-diverticulosis; hemorrhoids; polyp    HEMORRHOIDECTOMY      OTHER SURGICAL HISTORY      rectocele repair    TUBAL ABDOMINAL LIGATION       Social History     Socioeconomic History    Marital status:    Tobacco Use    Smoking status: Former     Current packs/day: 0.00     Types: Cigarettes     Quit date:      Years since quittin.5    Smokeless tobacco: Never    Vaping Use    Vaping status: Never Used   Substance and Sexual Activity    Alcohol use: Yes     Comment: 4-5 drinks per week    Drug use: No    Sexual activity: Defer     Family History   Problem Relation Age of Onset    Colon cancer Paternal Aunt     Breast cancer Neg Hx     Malig Hyperthermia Neg Hx      Review of Systems   Neurological:  Positive for dizziness, light-headedness and loss of balance.   All other systems reviewed and are negative.    Allergies   Allergen Reactions    Cefdinir Hives    Atorvastatin Other (See Comments)     LIGHTHEADED    Clindamycin Other (See Comments)    Hydrocodone     Levofloxacin      Tendon pain in left shoulder     Lisinopril      cough    Penicillins        Current Outpatient Medications:     BIOTIN PO, Take  by mouth., Disp: , Rfl:     cetirizine (zyrTEC) 10 MG tablet, Take 1 tablet by mouth Daily., Disp: , Rfl:     Cholecalciferol (VITAMIN D3) 75 MCG (3000 UT) tablet, Take 1 tablet by mouth Daily., Disp: , Rfl:     CRANBERRY PO, Take 1,680 mg by mouth Daily., Disp: , Rfl:     cyanocobalamin (VITAMIN B-12) 500 MCG tablet, Take 1 tablet by mouth Daily., Disp: , Rfl:     GARLIC PO, Take  by mouth., Disp: , Rfl:     levothyroxine (SYNTHROID, LEVOTHROID) 88 MCG tablet, 1 tablet Daily. 6 days a week, Disp: , Rfl:     Omega-3 Fatty Acids (FISH OIL) 1000 MG capsule capsule, Take  by mouth Daily., Disp: , Rfl:     ondansetron ODT (ZOFRAN-ODT) 4 MG disintegrating tablet, Place 1 tablet on the tongue Every 6 (Six) Hours As Needed for Nausea or Vomiting., Disp: 15 tablet, Rfl: 0    pantoprazole (PROTONIX) 20 MG EC tablet, Take 2 tablets by mouth Daily., Disp: , Rfl:     pravastatin (PRAVACHOL) 10 MG tablet, Take 1 tablet by mouth Every Night., Disp: , Rfl:     spironolactone (ALDACTONE) 25 MG tablet, Take 0.5 tablets by mouth Daily., Disp: , Rfl:     vitamin B-6 (PYRIDOXINE) 100 MG tablet, Take 1 tablet by mouth Daily., Disp: , Rfl:     vitamin C (ASCORBIC ACID) 500 MG tablet, Take 8  "tablets by mouth Daily., Disp: , Rfl:     ezetimibe (ZETIA) 10 MG tablet, Take 1 tablet by mouth Daily., Disp: , Rfl:       Objective:     Vitals:    07/03/24 1007   BP: 136/94   BP Location: Left arm   Pulse: 62   Weight: 67.8 kg (149 lb 6.4 oz)   Height: 172.7 cm (68\")       Body mass index is 22.72 kg/m².    Vitals reviewed.   Constitutional:       Appearance: Healthy appearance. Well-developed and not in distress.   Eyes:      Conjunctiva/sclera: Conjunctivae normal.   HENT:      Head: Normocephalic.      Nose: Nose normal.    Mouth/Throat:      Pharynx: Oropharynx is clear.   Neck:      Vascular: No JVD. JVD normal.      Lymphadenopathy: No cervical adenopathy.   Pulmonary:      Effort: Pulmonary effort is normal.      Breath sounds: Normal breath sounds.   Cardiovascular:      Normal rate. Regular rhythm.      Murmurs: There is no murmur.   Pulses:     Intact distal pulses.   Edema:     Peripheral edema absent.   Abdominal:      Palpations: Abdomen is soft.      Tenderness: There is no abdominal tenderness.   Musculoskeletal: Normal range of motion.      Cervical back: Normal range of motion. Skin:     General: Skin is warm and dry.      Findings: No rash.   Neurological:      General: No focal deficit present.      Mental Status: Alert and oriented to person, place, and time.      Cranial Nerves: No cranial nerve deficit.   Psychiatric:         Behavior: Behavior normal.         Thought Content: Thought content normal.         Judgment: Judgment normal.       Procedures EKG --   I have personally reviewed EKG on 5/8/2024 and my interpretation of the tracing is as follows: A paced, V sensed      Assessment:       Diagnosis Plan   1. Agatston CAC score, >400        2. Essential hypertension        3. Vasovagal syncope        4. History of pacemaker           Plan:       1.  She was noted to have an elevated calcium score in 2021.  Her score was 429.  I agree with a statin to help keep her LDL as low as possible " and to decrease the risk of plaque rupture.  She has not tolerated atorvastatin or rosuvastatin. She is on ezetimibe, and Dr Lauren recently added pravastatin. She has no anginal symptoms.     2.  Her blood pressure is well controlled on low-dose spironolactone.    3/4.  She has long standing recurrent vasovagal syncope associated with significant sinus pauses, and underwent implantation of a dual chamber Biotronik PPM in May 2024.       Sincerely,       Kamari Meehan MD

## 2024-08-28 ENCOUNTER — TRANSCRIBE ORDERS (OUTPATIENT)
Dept: ADMINISTRATIVE | Facility: HOSPITAL | Age: 79
End: 2024-08-28
Payer: MEDICARE

## 2024-08-28 DIAGNOSIS — Z12.31 SCREENING MAMMOGRAM FOR BREAST CANCER: Primary | ICD-10-CM

## 2024-10-04 ENCOUNTER — HOSPITAL ENCOUNTER (OUTPATIENT)
Dept: MAMMOGRAPHY | Facility: HOSPITAL | Age: 79
Discharge: HOME | End: 2024-10-04
Admitting: INTERNAL MEDICINE
Payer: MEDICARE

## 2024-10-04 DIAGNOSIS — Z12.31 SCREENING MAMMOGRAM FOR BREAST CANCER: ICD-10-CM

## 2024-10-04 PROCEDURE — 77063 BREAST TOMOSYNTHESIS BI: CPT

## 2024-10-04 PROCEDURE — 77067 SCR MAMMO BI INCL CAD: CPT

## 2025-06-13 ENCOUNTER — TELEPHONE (OUTPATIENT)
Dept: CARDIOLOGY | Age: 80
End: 2025-06-13

## 2025-06-13 NOTE — TELEPHONE ENCOUNTER
Caller: Anabell Damian    Relationship: Self    Best call back number: [phone]    What is the best time to reach you: ANYTIME    Who are you requesting to speak with (clinical staff, provider,  specific staff member): SE      What was the call regarding: PLEASE CALL PT BACK TO [facility] 07/07/2025 DR JIM APPT AND D/C.   PT WAS LEFT MESSAGE DR JIM WOULD NOT BE IN OFFICE     UNABLE TO WARM TRANSFER

## 2025-07-07 ENCOUNTER — OFFICE VISIT (OUTPATIENT)
Dept: CARDIOLOGY | Age: 80
End: 2025-07-07
Payer: MEDICARE

## 2025-07-07 ENCOUNTER — CLINICAL SUPPORT NO REQUIREMENTS (OUTPATIENT)
Age: 80
End: 2025-07-07
Payer: MEDICARE

## 2025-07-07 VITALS
WEIGHT: 154.6 LBS | SYSTOLIC BLOOD PRESSURE: 122 MMHG | OXYGEN SATURATION: 96 % | HEIGHT: 68 IN | HEART RATE: 61 BPM | BODY MASS INDEX: 23.43 KG/M2 | DIASTOLIC BLOOD PRESSURE: 64 MMHG

## 2025-07-07 DIAGNOSIS — Z95.0 HISTORY OF PACEMAKER: ICD-10-CM

## 2025-07-07 DIAGNOSIS — E78.2 MIXED HYPERLIPIDEMIA: ICD-10-CM

## 2025-07-07 DIAGNOSIS — R93.1 AGATSTON CAC SCORE, >400: ICD-10-CM

## 2025-07-07 DIAGNOSIS — I45.5 SINUS PAUSE: Primary | ICD-10-CM

## 2025-07-07 PROBLEM — R55 VASOVAGAL SYNCOPE: Status: RESOLVED | Noted: 2017-08-30 | Resolved: 2025-07-07

## 2025-07-07 PROCEDURE — 3078F DIAST BP <80 MM HG: CPT | Performed by: NURSE PRACTITIONER

## 2025-07-07 PROCEDURE — 1159F MED LIST DOCD IN RCRD: CPT | Performed by: NURSE PRACTITIONER

## 2025-07-07 PROCEDURE — 3074F SYST BP LT 130 MM HG: CPT | Performed by: NURSE PRACTITIONER

## 2025-07-07 PROCEDURE — 1160F RVW MEDS BY RX/DR IN RCRD: CPT | Performed by: NURSE PRACTITIONER

## 2025-07-07 PROCEDURE — 93000 ELECTROCARDIOGRAM COMPLETE: CPT | Performed by: NURSE PRACTITIONER

## 2025-07-07 PROCEDURE — 99214 OFFICE O/P EST MOD 30 MIN: CPT | Performed by: NURSE PRACTITIONER

## 2025-07-07 RX ORDER — FAMOTIDINE 20 MG/1
20 TABLET, FILM COATED ORAL
COMMUNITY
Start: 2024-08-19 | End: 2025-08-19

## 2025-07-07 RX ORDER — PRAVASTATIN SODIUM 10 MG
1 TABLET ORAL NIGHTLY
COMMUNITY
Start: 2024-07-09 | End: 2025-07-09

## 2025-07-07 RX ORDER — ASPIRIN 81 MG/1
81 TABLET, CHEWABLE ORAL
COMMUNITY
Start: 2025-06-11

## 2025-07-07 RX ORDER — ESTRADIOL 0.1 MG/G
CREAM VAGINAL
COMMUNITY
Start: 2025-06-12 | End: 2026-06-12

## 2025-07-07 NOTE — PROGRESS NOTES
Date of Office Visit: 2025  Encounter Provider: WILLIAM Garsia  Place of Service: [facility] CARDIOLOGY  Patient Name: Anabell Damian  :1945  Primary Cardiologist: Dr. Kamari Meehan     Chief Complaint   Patient presents with    Annual Exam   :     HPI: Anabell Damian is a 79 y.o. female who presents today for annual cardiac follow-up visit.  She is a new patient to me and I have reviewed her medical records.    She has known hypertension, hypothyroidism, and vertigo.    In , CT calcium score reported the following: Total score 429 (LM 43, , Cx 82, RCA 28).  In 2022, myocardial perfusion study was normal.    Over the course of her adult life, she has had 8-9 episodes of syncope.  Her last episode occurred in 2022.  She wore a loop recorder and was noted to have a 5.5-second pause at 12:30 PM while shopping.  She underwent implantation of dual-chamber Biotronik pacemaker in May 2024.    She presents today for annual cardiac follow-up visit. She just had her pacemaker check showed no new events.  On 2025, she had an episode of lightheadedness, nausea, and heart rate was 60.  She has the pacemaker nurse if she had an episode at that time and she did not.  She denies chest pain, shortness of air, palpitations, edema, syncope, or bleeding.  She had an episode of lightheadedness when reaching up into one of her cabinets.    I asked if she is taking her aspirin 81 mg daily and she currently is not.  She says she has occasional nosebleeds of unknown etiology and is seeing an ENT.  She also recently had low platelets.  She is going to discuss with her PCP.  Blood pressure and heart rate are normal today.      Past Medical History:   Diagnosis Date    Adenomatous polyp of colon 2020    Agatston CAC score, >400     429 in : LM 43, , Cx 82, RCA 28    Benign paroxysmal positional vertigo due to bilateral vestibular disorder  2017    Encounter for screening for malignant neoplasm of colon 2020    GERD without esophagitis 2022    Heart murmur     History of colon polyps 2023    History of loop recorder 2023    removed 2024    History of pacemaker 2024    Hyperlipidemia     Hypertension     Hypothyroidism     PONV (postoperative nausea and vomiting)     Syncope and collapse 2017       Past Surgical History:   Procedure Laterality Date    BREAST CYST ASPIRATION      CARDIAC ELECTROPHYSIOLOGY PROCEDURE N/A 2024    Procedure: Pacemaker DC new BIOTRONIK;  Surgeon: Ananda Morton MD;  Location: [facility] CATH INVASIVE LOCATION;  Service: Cardiovascular;  Laterality: N/A;    COLONOSCOPY  2006    COLONOSCOPY N/A 10/18/2017    Procedure: COLONOSCOPY into cecum with cold biopsy polypectomy and hot snare polypectomies;  Surgeon: Rosanne Borden MD;  Location: [facility] ENDOSCOPY;  Service:     COLONOSCOPY N/A 2020    Procedure: COLONOSCOPY into cecum with cold biopsy polypectomies;  Surgeon: Rosanne Borden MD;  Location: [facility] ENDOSCOPY;  Service: Gastroenterology;  Laterality: N/A;  Pre op: History of Polyps  Post op: Diverticulosis, Polyps    COLONOSCOPY N/A 2023    Procedure: COLONOSCOPY to the cecum with cold snare polypectomy;  Surgeon: Rosanne Borden MD;  Location: [facility] ENDOSCOPY;  Service: Gastroenterology;  Laterality: N/A;  preop-hx of polyps  postop-diverticulosis; hemorrhoids; polyp    HEMORRHOIDECTOMY      INSERT / REPLACE / REMOVE PACEMAKER  2024    OTHER SURGICAL HISTORY      rectocele repair    TUBAL ABDOMINAL LIGATION         Social History     Socioeconomic History    Marital status:    Tobacco Use    Smoking status: Former     Current packs/day: 0.00     Average packs/day: 0.5 packs/day for 10.0 years (5.0 ttl pk-yrs)     Types: Cigarettes     Quit date:      Years since quittin.5     Passive exposure: Past    Smokeless tobacco:  Never    Tobacco comments:     Stopped smoking 1982   Vaping Use    Vaping status: Never Used   Substance and Sexual Activity    Alcohol use: Yes     Alcohol/week: 5.0 standard drinks of alcohol     Types: 5 Shots of liquor per week     Comment: 4-5 drinks per week    Drug use: Never    Sexual activity: Not Currently     Partners: Male     Birth control/protection: Tubal ligation       Family History   Problem Relation Age of Onset    Colon cancer Paternal Aunt     Heart disease Mother     Hyperlipidemia Mother     Hypertension Mother     Heart disease Father     Hypertension Father     Hyperlipidemia Sister     Hypertension Sister     Breast cancer Neg Hx     Malig Hyperthermia Neg Hx        The following portion of the patient's history were reviewed and updated as appropriate: past medical history, past surgical history, past social history, past family history, allergies, current medications, and problem list.    Review of Systems   Constitutional: Negative.   HENT:  Positive for nosebleeds.    Cardiovascular: Negative.    Respiratory: Negative.     Neurological:  Positive for light-headedness.       Allergies   Allergen Reactions    Cefdinir Hives    Atorvastatin Other (See Comments)     LIGHTHEADED    Clindamycin Other (See Comments)    Hydrocodone     Levofloxacin      Tendon pain in left shoulder     Lisinopril      cough    Penicillins          Current Outpatient Medications:     ascorbic acid (VITAMIN C) 1000 MG tablet, Take 1 tablet by mouth Daily., Disp: , Rfl:     aspirin 81 MG chewable tablet, Chew 1 tablet. (Patient taking differently: Chew 1 tablet. Patient reported on hold due to UTI), Disp: , Rfl:     BIOTIN PO, Take  by mouth., Disp: , Rfl:     cetirizine (zyrTEC) 10 MG tablet, Take 1 tablet by mouth Daily., Disp: , Rfl:     Cholecalciferol (VITAMIN D3) 75 MCG (3000 UT) tablet, Take 1 tablet by mouth Daily., Disp: , Rfl:     CRANBERRY PO, Take 1,680 mg by mouth Daily., Disp: , Rfl:      "cyanocobalamin (VITAMIN B-12) 500 MCG tablet, Take 1 tablet by mouth Daily., Disp: , Rfl:     estradiol (ESTRACE) 0.1 MG/GM vaginal cream, Apply 0.5 gram to vagina nightly for 1 week then every Monday/Wednesday/Friday., Disp: , Rfl:     famotidine (PEPCID) 20 MG tablet, Take 1 tablet by mouth., Disp: , Rfl:     GARLIC PO, Take  by mouth., Disp: , Rfl:     levothyroxine (SYNTHROID, LEVOTHROID) 88 MCG tablet, 1 tablet Daily. 6 days a week, Disp: , Rfl:     Omega-3 Fatty Acids (FISH OIL) 1000 MG capsule capsule, Take  by mouth Daily., Disp: , Rfl:     pravastatin (PRAVACHOL) 10 MG tablet, Take 1 tablet by mouth Every Night., Disp: , Rfl:     spironolactone (ALDACTONE) 25 MG tablet, Take 0.5 tablets by mouth Daily., Disp: , Rfl:     vitamin B-6 (PYRIDOXINE) 100 MG tablet, Take 1 tablet by mouth Daily., Disp: , Rfl:     vitamin C (ASCORBIC ACID) 500 MG tablet, Take 8 tablets by mouth Daily., Disp: , Rfl:     ezetimibe (ZETIA) 10 MG tablet, Take 1 tablet by mouth Daily., Disp: , Rfl:     ondansetron ODT (ZOFRAN-ODT) 4 MG disintegrating tablet, Place 1 tablet on the tongue Every 6 (Six) Hours As Needed for Nausea or Vomiting. (Patient not taking: Reported on 7/7/2025), Disp: 15 tablet, Rfl: 0    pantoprazole (PROTONIX) 20 MG EC tablet, Take 2 tablets by mouth Daily. (Patient not taking: Reported on 7/7/2025), Disp: , Rfl:     pravastatin (PRAVACHOL) 10 MG tablet, Take 1 tablet by mouth Every Night., Disp: , Rfl:          Objective:     Vitals:    07/07/25 1417   BP: 122/64   BP Location: Left arm   Patient Position: Sitting   Cuff Size: Adult   Pulse: 61   SpO2: 96%   Weight: 70.1 kg (154 lb 9.6 oz)   Height: 172.7 cm (67.99\")     Body mass index is 23.51 kg/m².    PHYSICAL EXAM:    Vitals Reviewed.   General Appearance: No acute distress, well developed and well nourished.  Thin.  Eyes: Glasses.   HENT: No hearing loss noted.    Respiratory: No signs of respiratory distress. Respiration rhythm and depth normal.  Clear to " auscultation.   Cardiovascular:  Jugular Venous Pressure: Normal  Heart Rate and Rhythm: Normal, Heart Sounds: Normal S1 and S2. No S3 or S4 noted.  Murmurs: No murmurs noted. No rubs, thrills, or gallops.   Lower Extremities: No edema noted.  Musculoskeletal: Normal movement of extremities.  Skin: General appearance normal.    Psychiatric: Patient alert and oriented to person, place, and time. Speech and behavior appropriate. Normal mood and affect.       ECG 12 Lead    Date/Time: 7/7/2025 2:02 PM  Performed by: Erinn Phillips APRN    Authorized by: Erinn Phillips APRN  Comparison: compared with previous ECG from 6/24/2024  Similar to previous ECG  Rhythm: sinus rhythm  Rate: normal  BPM: 61  Conduction: non-specific intraventricular conduction delay  Q waves: V1, V2 and V3    ST Segments: ST segments normal  T Waves: T waves normal  QRS axis: normal    Clinical impression: non-specific ECG            Assessment:      No diagnosis found.       Plan:       1/2/3.  History of syncope and 5.5-second pause.: Underwent dual-chamber pacemaker placement in May 2024.  Continue routine pacemaker checks.    4.  Coronary Artery Calcium: CT calcium score was 429 (LM 43, , Cx 82, RCA 28).  Factor modification discussed.  On pravastatin.  I recommend aspirin 81 mg daily from cardiac standpoint.  She will discuss with her PCP restarting aspirin with recent low platelet count.    5.  EKG shows some nonspecific abnormalities which have been present, Q waves V1-V3 and IVCD.  Stable.    6.  Hypertension: Blood pressure normal today.    7.  Hyperlipidemia: On pravastatin and ezetimibe.  Followed by her PCP.    8.  She will call with any cardiac concerns.  Follow-up with Dr. Meehan in 1 year.    As always, it has been a pleasure to participate in your patient's care. Thank you.         Sincerely,         WILLIAM Craven  [facility] Cardiology      Dictated utilizing Dragon Dictation

## 2025-07-08 LAB
MDC_IDC_MSMT_BATTERY_STATUS: NORMAL
MDC_IDC_MSMT_LEADCHNL_RA_IMPEDANCE_VALUE: 565
MDC_IDC_MSMT_LEADCHNL_RA_PACING_THRESHOLD_AMPLITUDE: 0.5
MDC_IDC_MSMT_LEADCHNL_RA_PACING_THRESHOLD_PULSEWIDTH: 0.4
MDC_IDC_MSMT_LEADCHNL_RA_SENSING_INTR_AMPL: 5.7
MDC_IDC_MSMT_LEADCHNL_RV_IMPEDANCE_VALUE: 565
MDC_IDC_MSMT_LEADCHNL_RV_PACING_THRESHOLD_AMPLITUDE: 0.8
MDC_IDC_MSMT_LEADCHNL_RV_PACING_THRESHOLD_PULSEWIDTH: 0.4
MDC_IDC_MSMT_LEADCHNL_RV_SENSING_INTR_AMPL: 13.6
MDC_IDC_PG_IMPLANT_DTM: NORMAL
MDC_IDC_PG_MFG: NORMAL
MDC_IDC_PG_MODEL: NORMAL
MDC_IDC_PG_SERIAL: NORMAL
MDC_IDC_PG_TYPE: NORMAL
MDC_IDC_SESS_DTM: NORMAL
MDC_IDC_SET_BRADY_AT_MODE_SWITCH_RATE: 160
MDC_IDC_SET_BRADY_LOWRATE: 60
MDC_IDC_SET_BRADY_MAX_SENSOR_RATE: 120
MDC_IDC_SET_BRADY_MAX_TRACKING_RATE: 130
MDC_IDC_SET_BRADY_MODE: NORMAL
MDC_IDC_SET_LEADCHNL_RA_PACING_AMPLITUDE: 1.5
MDC_IDC_SET_LEADCHNL_RA_PACING_PULSEWIDTH: 0.4
MDC_IDC_SET_LEADCHNL_RV_PACING_AMPLITUDE: 1.3
MDC_IDC_SET_LEADCHNL_RV_PACING_PULSEWIDTH: 0.4
MDC_IDC_STAT_BRADY_RA_PERCENT_PACED: 31

## 2025-08-19 LAB
MDC_IDC_MSMT_BATTERY_REMAINING_PERCENTAGE: 90 %
MDC_IDC_MSMT_BATTERY_STATUS: NORMAL
MDC_IDC_MSMT_LEADCHNL_RA_DTM: NORMAL
MDC_IDC_MSMT_LEADCHNL_RA_IMPEDANCE_VALUE: 663
MDC_IDC_MSMT_LEADCHNL_RA_PACING_THRESHOLD_AMPLITUDE: 0.6
MDC_IDC_MSMT_LEADCHNL_RA_PACING_THRESHOLD_POLARITY: NORMAL
MDC_IDC_MSMT_LEADCHNL_RA_PACING_THRESHOLD_PULSEWIDTH: 0.4
MDC_IDC_MSMT_LEADCHNL_RA_SENSING_INTR_AMPL: 4.4
MDC_IDC_MSMT_LEADCHNL_RV_DTM: NORMAL
MDC_IDC_MSMT_LEADCHNL_RV_IMPEDANCE_VALUE: 546
MDC_IDC_MSMT_LEADCHNL_RV_PACING_THRESHOLD_AMPLITUDE: 0.9
MDC_IDC_MSMT_LEADCHNL_RV_PACING_THRESHOLD_POLARITY: NORMAL
MDC_IDC_MSMT_LEADCHNL_RV_PACING_THRESHOLD_PULSEWIDTH: 0.4
MDC_IDC_MSMT_LEADCHNL_RV_SENSING_INTR_AMPL: 13.5
MDC_IDC_PG_IMPLANT_DTM: NORMAL
MDC_IDC_PG_MFG: NORMAL
MDC_IDC_PG_MODEL: NORMAL
MDC_IDC_PG_SERIAL: NORMAL
MDC_IDC_PG_TYPE: NORMAL
MDC_IDC_SESS_DTM: NORMAL
MDC_IDC_SESS_TYPE: NORMAL
MDC_IDC_SET_BRADY_AT_MODE_SWITCH_RATE: 160
MDC_IDC_SET_BRADY_LOWRATE: 60
MDC_IDC_SET_BRADY_MAX_SENSOR_RATE: 120
MDC_IDC_SET_BRADY_MAX_TRACKING_RATE: 130
MDC_IDC_SET_BRADY_MODE: NORMAL
MDC_IDC_SET_BRADY_PAV_DELAY: 240
MDC_IDC_SET_BRADY_SAV_DELAY: 210
MDC_IDC_SET_LEADCHNL_RA_PACING_AMPLITUDE: 1.6
MDC_IDC_SET_LEADCHNL_RA_PACING_POLARITY: NORMAL
MDC_IDC_SET_LEADCHNL_RA_PACING_PULSEWIDTH: 0.4
MDC_IDC_SET_LEADCHNL_RA_SENSING_POLARITY: NORMAL
MDC_IDC_SET_LEADCHNL_RV_PACING_AMPLITUDE: 1.4
MDC_IDC_SET_LEADCHNL_RV_PACING_POLARITY: NORMAL
MDC_IDC_SET_LEADCHNL_RV_PACING_PULSEWIDTH: 0.4
MDC_IDC_SET_LEADCHNL_RV_SENSING_POLARITY: NORMAL
MDC_IDC_STAT_AT_BURDEN_PERCENT: 0
MDC_IDC_STAT_BRADY_RA_PERCENT_PACED: 43
MDC_IDC_STAT_BRADY_RV_PERCENT_PACED: 0

## (undated) DEVICE — TUBING, SUCTION, 1/4" X 10', STRAIGHT: Brand: MEDLINE

## (undated) DEVICE — Device

## (undated) DEVICE — ADAPT CLN BIOGUARD AIR/H2O DISP

## (undated) DEVICE — THE SINGLE USE ETRAP – POLYP TRAP IS USED FOR SUCTION RETRIEVAL OF ENDOSCOPICALLY REMOVED POLYPS.: Brand: ETRAP

## (undated) DEVICE — SNAR POLYP SENSATION STDOVL 27 240 BX40

## (undated) DEVICE — SENSR O2 OXIMAX FNGR A/ 18IN NONSTR

## (undated) DEVICE — CANN O2 ETCO2 FITS ALL CONN CO2 SMPL A/ 7IN DISP LF

## (undated) DEVICE — SINGLE-USE BIOPSY FORCEPS: Brand: RADIAL JAW 4

## (undated) DEVICE — KT ORCA ORCAPOD DISP STRL

## (undated) DEVICE — LN SMPL CO2 SHTRM SD STREAM W/M LUER

## (undated) DEVICE — LOU PACE DEFIB: Brand: MEDLINE INDUSTRIES, INC.

## (undated) DEVICE — DS LD SELECTRA3D GUIDE/CATH 55MM 42CM

## (undated) DEVICE — THE TORRENT IRRIGATION SCOPE CONNECTOR IS USED WITH THE TORRENT IRRIGATION TUBING TO PROVIDE IRRIGATION FLUIDS SUCH AS STERILE WATER DURING GASTROINTESTINAL ENDOSCOPIC PROCEDURES WHEN USED IN CONJUNCTION WITH AN IRRIGATION PUMP (OR ELECTROSURGICAL UNIT).: Brand: TORRENT

## (undated) DEVICE — KT ACC SELECTRA SYS LD DEL W/INTRO

## (undated) DEVICE — CANN NASL CO2 TRULINK W/O2 A/

## (undated) DEVICE — INTRO SHEATH PRELUDE SNAP .038 6F 13CM W/SDPRT

## (undated) DEVICE — ERBE NESSY®PLATE 170 SPLIT; 168CM²; CABLE 3M: Brand: ERBE

## (undated) DEVICE — Device: Brand: DEFENDO AIR/WATER/SUCTION AND BIOPSY VALVE

## (undated) DEVICE — INTRO SHEATH PRELUDE SNAP .038 9F 13CM W/SDPRT BLK

## (undated) DEVICE — ADHS SKIN SURG TISS VISC PREMIERPRO EXOFIN HI/VISC FAST/DRY

## (undated) DEVICE — TBG 02 CRUSH RESIST LF CLR 7FT

## (undated) DEVICE — TBG PENCL TELESCP MEGADYNE SMOKE EVAC 10FT